# Patient Record
Sex: FEMALE | Race: BLACK OR AFRICAN AMERICAN | Employment: UNEMPLOYED | URBAN - METROPOLITAN AREA
[De-identification: names, ages, dates, MRNs, and addresses within clinical notes are randomized per-mention and may not be internally consistent; named-entity substitution may affect disease eponyms.]

---

## 2017-07-01 ENCOUNTER — HOSPITAL ENCOUNTER (EMERGENCY)
Age: 47
Discharge: HOME | End: 2017-07-01
Attending: EMERGENCY MEDICINE
Payer: SELF-PAY

## 2017-07-01 VITALS
HEIGHT: 61 IN | OXYGEN SATURATION: 97 % | WEIGHT: 160 LBS | DIASTOLIC BLOOD PRESSURE: 79 MMHG | BODY MASS INDEX: 30.21 KG/M2 | HEART RATE: 79 BPM | TEMPERATURE: 99.1 F | SYSTOLIC BLOOD PRESSURE: 145 MMHG | RESPIRATION RATE: 20 BRPM

## 2017-07-01 DIAGNOSIS — J45.901 ASTHMATIC BRONCHITIS WITH ACUTE EXACERBATION: Primary | ICD-10-CM

## 2017-07-01 PROCEDURE — 99282 EMERGENCY DEPT VISIT SF MDM: CPT

## 2017-07-01 RX ORDER — HYDROCODONE BITARTRATE AND ACETAMINOPHEN 5; 325 MG/1; MG/1
TABLET ORAL
Qty: 20 TAB | Refills: 0 | Status: SHIPPED | OUTPATIENT
Start: 2017-07-01 | End: 2020-07-20 | Stop reason: ALTCHOICE

## 2017-07-01 RX ORDER — DOXYCYCLINE 100 MG/1
100 CAPSULE ORAL 2 TIMES DAILY
Qty: 14 CAP | Refills: 0 | Status: SHIPPED | OUTPATIENT
Start: 2017-07-01 | End: 2017-07-08

## 2017-07-01 RX ORDER — ALBUTEROL SULFATE 90 UG/1
2 AEROSOL, METERED RESPIRATORY (INHALATION)
Qty: 1 INHALER | Refills: 5 | Status: SHIPPED | OUTPATIENT
Start: 2017-07-01 | End: 2017-07-31

## 2017-07-01 RX ORDER — PREDNISONE 50 MG/1
50 TABLET ORAL DAILY
Qty: 5 TAB | Refills: 0 | Status: SHIPPED | OUTPATIENT
Start: 2017-07-01 | End: 2017-07-06

## 2017-07-01 NOTE — DISCHARGE INSTRUCTIONS
Bronchitis: Care Instructions  Your Care Instructions    Bronchitis is inflammation of the bronchial tubes, which carry air to the lungs. The tubes swell and produce mucus, or phlegm. The mucus and inflamed bronchial tubes make you cough. You may have trouble breathing. Most cases of bronchitis are caused by viruses like those that cause colds. Antibiotics usually do not help and they may be harmful. Bronchitis usually develops rapidly and lasts about 2 to 3 weeks in otherwise healthy people. Follow-up care is a key part of your treatment and safety. Be sure to make and go to all appointments, and call your doctor if you are having problems. It's also a good idea to know your test results and keep a list of the medicines you take. How can you care for yourself at home? · Take all medicines exactly as prescribed. Call your doctor if you think you are having a problem with your medicine. · Get some extra rest.  · Take an over-the-counter pain medicine, such as acetaminophen (Tylenol), ibuprofen (Advil, Motrin), or naproxen (Aleve) to reduce fever and relieve body aches. Read and follow all instructions on the label. · Do not take two or more pain medicines at the same time unless the doctor told you to. Many pain medicines have acetaminophen, which is Tylenol. Too much acetaminophen (Tylenol) can be harmful. · Take an over-the-counter cough medicine that contains dextromethorphan to help quiet a dry, hacking cough so that you can sleep. Avoid cough medicines that have more than one active ingredient. Read and follow all instructions on the label. · Breathe moist air from a humidifier, hot shower, or sink filled with hot water. The heat and moisture will thin mucus so you can cough it out. · Do not smoke. Smoking can make bronchitis worse. If you need help quitting, talk to your doctor about stop-smoking programs and medicines. These can increase your chances of quitting for good.   When should you call for help? Call 911 anytime you think you may need emergency care. For example, call if:  · You have severe trouble breathing. Call your doctor now or seek immediate medical care if:  · You have new or worse trouble breathing. · You cough up dark brown or bloody mucus (sputum). · You have a new or higher fever. · You have a new rash. Watch closely for changes in your health, and be sure to contact your doctor if:  · You cough more deeply or more often, especially if you notice more mucus or a change in the color of your mucus. · You are not getting better as expected. Where can you learn more? Go to http://nyla-chandu.info/. Enter H333 in the search box to learn more about \"Bronchitis: Care Instructions. \"  Current as of: March 25, 2017  Content Version: 11.3  © 2448-3059 [employer]. Care instructions adapted under license by [employer] (which disclaims liability or warranty for this information). If you have questions about a medical condition or this instruction, always ask your healthcare professional. [address] any warranty or liability for your use of this information.

## 2017-07-01 NOTE — ED TRIAGE NOTES
5 days woke with sinus problems using mucinex. Now having trouble breathing and unable to inhale fully.  Painful cough with yellow phelgm

## 2017-07-01 NOTE — ED NOTES
Pt states ready for discharge. Pt states she will follow up with PCP as instructed by provider. Pt appears in NOAD. I have reviewed discharge instructions with the patient. Prescriptions were reviewed with patient instructed not to drink alcohol, drive a car, or operate heavy machinery while taking this medicine. The patient verbalized understanding. Patient seen leaving ED ambulatory without difficulty or need for assistance, with S/O in no sign of distress. Patient armband removed and shredded. Current Discharge Medication List      START taking these medications    Details   HYDROcodone-acetaminophen (NORCO) 5-325 mg per tablet Take 1-2 tablets PO every 4-6 hours as needed for pain control. If over the counter ibuprofen or acetaminophen was suggested, then only take the vicodin for pain not well controlled with the over the counter medication. Qty: 20 Tab, Refills: 0      doxycycline (MONODOX) 100 mg capsule Take 1 Cap by mouth two (2) times a day for 7 days. Qty: 14 Cap, Refills: 0      predniSONE (DELTASONE) 50 mg tablet Take 1 Tab by mouth daily for 5 days. Qty: 5 Tab, Refills: 0      albuterol (PROVENTIL HFA, VENTOLIN HFA, PROAIR HFA) 90 mcg/actuation inhaler Take 2 Puffs by inhalation every four (4) hours as needed for Wheezing for up to 30 days.   Qty: 1 Inhaler, Refills: 5

## 2017-07-01 NOTE — ED PROVIDER NOTES
HPI Comments: 9:26 AM Teodora Patient is a 40 y.o. female who presents to the ED for the evaluation of cough and congestion about 5 days. Pt also reports fever, rhinorrhea, sinus pressure, sore throat, and pleuritic CP, secondary to coughing. States that she has used a humidifier and Mucin ex, but denies relief. No other complaints at this time. PMHx: Pt has a past medical history of Depression (2014) and Fibromyalgia (2010). She also has no past medical history of Psychotic disorder. PCP: No primary care provider on file. The history is provided by the patient. Past Medical History:   Diagnosis Date    Anxiety     Depression 2014    Fibromyalgia 2010       Past Surgical History:   Procedure Laterality Date    HX TONSILLECTOMY  0         Family History:   Problem Relation Age of Onset    Heart Disease Father     Diabetes Sister        Social History     Social History    Marital status: SINGLE     Spouse name: N/A    Number of children: N/A    Years of education: N/A     Occupational History    Not on file. Social History Main Topics    Smoking status: Former Smoker     Packs/day: 0.04     Types: Cigarettes    Smokeless tobacco: Never Used    Alcohol use 0.6 - 1.2 oz/week     1 - 2 Glasses of wine per week    Drug use: No    Sexual activity: Not on file     Other Topics Concern    Not on file     Social History Narrative         ALLERGIES: Review of patient's allergies indicates no known allergies. Review of Systems   Constitutional: Positive for fever. Negative for chills, fatigue and unexpected weight change. HENT: Positive for congestion, rhinorrhea, sinus pressure and sore throat. Respiratory: Positive for cough. Negative for chest tightness and shortness of breath. Cardiovascular: Positive for chest pain. Negative for palpitations and leg swelling. Gastrointestinal: Negative for abdominal pain, nausea and vomiting. Genitourinary: Negative for dysuria. Musculoskeletal: Negative for back pain. Skin: Negative for rash. Neurological: Negative for dizziness and weakness. Psychiatric/Behavioral: The patient is not nervous/anxious. Vitals:    07/01/17 0930   BP: 145/79   Pulse: 79   Resp: 20   Temp: 99.1 °F (37.3 °C)   SpO2: 97%   Weight: 72.6 kg (160 lb)   Height: 5' 1\" (1.549 m)        97% on RA, indicating adequate oxygenation. Physical Exam   Constitutional: She appears well-developed and well-nourished. No distress. HENT:   Head: Normocephalic. Right Ear: External ear normal.   Left Ear: External ear normal.   Mouth/Throat: No oropharyngeal exudate. Eyes: Conjunctivae and EOM are normal. Pupils are equal, round, and reactive to light. Right eye exhibits no discharge. Left eye exhibits no discharge. No scleral icterus. Neck: Normal range of motion. Neck supple. No tracheal deviation present. No thyromegaly present. Cardiovascular: Normal rate, regular rhythm and normal heart sounds. Exam reveals no gallop and no friction rub. No murmur heard. Pulmonary/Chest: Effort normal. No stridor. No respiratory distress. She has wheezes. She has no rales. She exhibits no tenderness. Abdominal: Soft. Musculoskeletal: She exhibits no edema or tenderness. Lymphadenopathy:     She has no cervical adenopathy. Neurological: She is alert. No cranial nerve deficit. Coordination normal.   Skin: Skin is warm. Psychiatric: She has a normal mood and affect. Her behavior is normal. Judgment and thought content normal.        MDM  Number of Diagnoses or Management Options  Asthmatic bronchitis with acute exacerbation:   Diagnosis management comments: Imp: Asthmatic bronchitis. ED Course       Procedures    Medications ordered:   Medications - No data to display        Reevaluation of patient:   I have reevaluated patient. Patient is feeling better  No results found for this or any previous visit (from the past 12 hour(s)).     Medications ordered:   Medications - No data to display          1. Asthmatic bronchitis with acute exacerbation            Dispo:  Patient was discharged home in stable condition. Patient is to return to emergency department with any new or worsening condition. Follow up with family Sarmad Alcantara here immediately at any time for recurrent symptoms, new symptoms, any concerns, or if unable to obtain follow-up as directed. Diagnosis: No diagnosis found. Follow-up Information     Follow up With Details Comments Pop Call in 3 days  [facility] [id number]  [phone] [address] [facility] EMERGENCY DEPT  As needed, If symptoms worsen [address] [facility]  [phone]           Patient's Medications   Start Taking    No medications on file   Continue Taking    ACETAMINOPHEN (TYLENOL 8 HOUR PO)    Take  by mouth. DULOXETINE (CYMBALTA) 60 MG CAPSULE    Take 1 Cap by mouth daily. These Medications have changed    No medications on file   Stop Taking    No medications on file         SCRIBE ATTESTATION STATEMENT  Documented by: Joel Hurtado. Gustavo Rodriguez for, and in the presence of, Gladis Gutierres MD 9:27 AM     Signed by Harry Whyte, 7/1/2017 9:27 AM     PROVIDER ATTESTATION STATEMENT  I personally performed the services described in the documentation, reviewed the documentation, as recorded by the scribe in my presence, and it accurately and completely records my words and actions.   Gladis Gutierres MD

## 2020-07-20 ENCOUNTER — OFFICE VISIT (OUTPATIENT)
Dept: FAMILY MEDICINE CLINIC | Age: 50
End: 2020-07-20

## 2020-07-20 ENCOUNTER — VIRTUAL VISIT (OUTPATIENT)
Dept: FAMILY MEDICINE CLINIC | Age: 50
End: 2020-07-20

## 2020-07-20 VITALS
RESPIRATION RATE: 18 BRPM | DIASTOLIC BLOOD PRESSURE: 55 MMHG | HEART RATE: 82 BPM | OXYGEN SATURATION: 96 % | WEIGHT: 175.4 LBS | TEMPERATURE: 98.9 F | BODY MASS INDEX: 33.12 KG/M2 | SYSTOLIC BLOOD PRESSURE: 130 MMHG | HEIGHT: 61 IN

## 2020-07-20 DIAGNOSIS — Z13.6 SCREENING FOR CARDIOVASCULAR CONDITION: ICD-10-CM

## 2020-07-20 DIAGNOSIS — E55.9 VITAMIN D DEFICIENCY: ICD-10-CM

## 2020-07-20 DIAGNOSIS — K04.7 TOOTH INFECTION: ICD-10-CM

## 2020-07-20 DIAGNOSIS — M79.7 FIBROMYALGIA: Primary | ICD-10-CM

## 2020-07-20 RX ORDER — DULOXETIN HYDROCHLORIDE 60 MG/1
60 CAPSULE, DELAYED RELEASE ORAL DAILY
Qty: 90 CAP | Refills: 1 | Status: SHIPPED | OUTPATIENT
Start: 2020-07-20

## 2020-07-20 NOTE — PROGRESS NOTES
HPI:  Wendy Hardy is a 51 y.o. female who presents today with   Chief Complaint   Patient presents with    Fibromyalgia     NP    Vitamin D Deficiency    Jaw Swelling     due to tooth infection    Pt is a new pt to the practice. She has a h/o fibromyalgia and is on cymbalta; She needs a refill on cymbalta. Pt also requested antibiotics after she left out of the exam room for a tooth infection she has with associated jaw swelling. She will plan to see a dentist when she can. Pt also has a h/o Vitamin D deficiency. Needs a recheck on this as well.    3 most recent PHQ Screens 11/6/2015   Little interest or pleasure in doing things Several days   Feeling down, depressed, irritable, or hopeless Several days   Total Score PHQ 2 2   she would like to get some screening blood tests done. PMH,  Meds, Allergies, Family History, Social history reviewed      Current Outpatient Medications   Medication Sig Dispense Refill    DULoxetine (CYMBALTA) 60 mg capsule Take 1 Cap by mouth daily. 90 Cap 1    ACETAMINOPHEN (TYLENOL 8 HOUR PO) Take  by mouth. Allergies   Allergen Reactions    Hydrocodone Vertigo                  Review of Systems   Constitutional: Negative for chills and fever. Respiratory: Negative for shortness of breath and wheezing. Cardiovascular: Negative for chest pain and palpitations. All other systems reviewed and are negative.         Visit Vitals  /55   Pulse 82   Temp 98.9 °F (37.2 °C) (Oral)   Resp 18   Ht 5' 1\" (1.549 m)   Wt 175 lb 6.4 oz (79.6 kg)   LMP 06/28/2020   SpO2 96%   BMI 33.14 kg/m²     Physical Exam   Visit Vitals  /55   Pulse 82   Temp 98.9 °F (37.2 °C) (Oral)   Resp 18   Ht 5' 1\" (1.549 m)   Wt 175 lb 6.4 oz (79.6 kg)   LMP 06/28/2020   SpO2 96%   BMI 33.14 kg/m²     General appearance: alert, cooperative, no distress, appears stated age  Neck: supple, symmetrical, trachea midline, no adenopathy, thyroid: not enlarged, symmetric, no tenderness/mass/nodules, no carotid bruit and no JVD  Lungs: clear to auscultation bilaterally  Heart: regular rate and rhythm, S1, S2 normal, no murmur, click, rub or gallop  Extremities: extremities normal, atraumatic, no cyanosis or edema      Assessment/Plan:  Diagnoses and all orders for this visit:    1. Fibromyalgia    2. Screening for cardiovascular condition  -     LIPID PANEL; Future  -     METABOLIC PANEL, BASIC; Future    3. Vitamin D deficiency  -     VITAMIN D, 25 HYDROXY; Future    4. Tooth infection    Other orders  -     DULoxetine (CYMBALTA) 60 mg capsule; Take 1 Cap by mouth daily. As above, all new to this provider   treatment plan as listed below  Orders Placed This Encounter    LIPID PANEL    METABOLIC PANEL, BASIC    VITAMIN D, 25 HYDROXY    DULoxetine (CYMBALTA) 60 mg capsule     Refilled cymbalta  Labs as ordered      Follow-up and Dispositions    · Return in about 2 months (around 9/20/2020), or PAP / BREAST EXAM.      This has been fully explained to the patient, who indicates understanding.         Vinicio Noe MD

## 2020-07-20 NOTE — PATIENT INSTRUCTIONS
Fibromyalgia: Care Instructions  Overview     Fibromyalgia is a painful condition that is not completely understood by medical experts. The cause of fibromyalgia is not known. It can make you feel tired and ache all over. It causes tender spots at specific points of the body that hurt only when you press on them. You may have trouble sleeping, as well as other symptoms. These problems can upset your work and home life. Symptoms tend to come and go, although they may never go away completely. Fibromyalgia does not harm your muscles, joints, or organs. Follow-up care is a key part of your treatment and safety. Be sure to make and go to all appointments, and call your doctor if you are having problems. It's also a good idea to know your test results and keep a list of the medicines you take. How can you care for yourself at home? · Exercise often. Walk, swim, or bike to help with pain and sleep problems and to make you feel better. · Try to get a good night's sleep. Go to bed and get up at the same time each day, whether you feel rested or not. Make sure you have a good mattress and pillow. · Reduce stress. Avoid things that cause you stress, if you can. If not, work at making them less stressful. Learn to use biofeedback, guided imagery, meditation, or other methods to relax. · Make healthy changes. Eat a balanced diet, quit smoking, and limit alcohol and caffeine. · Use a heating pad set on low or take warm baths or showers for pain. Using cold packs for up to 20 minutes at a time can also relieve pain. Put a thin cloth between the cold pack and your skin. A gentle massage might help too. · Be safe with medicines. Take your medicines exactly as prescribed. Call your doctor if you think you are having a problem with your medicine. Your doctor may talk to you about taking antidepressant medicines. These medicines may improve sleep, relieve pain, and in some cases treat depression.   · Learn about fibromyalgia. This makes coping easier. Then, take an active role in your treatment. · Think about joining a support group with others who have fibromyalgia to learn more and get support. When should you call for help? Watch closely for changes in your health, and be sure to contact your doctor if:  · You feel sad, helpless, or hopeless; lose interest in things you used to enjoy; or have other symptoms of depression. · Your fibromyalgia symptoms get worse. Where can you learn more? Go to http://nyla-chandu.info/  Enter V003 in the search box to learn more about \"Fibromyalgia: Care Instructions. \"  Current as of: November 20, 2019               Content Version: 12.5  © 6926-3424 Healthwise, [employer]. Care instructions adapted under license by [employer] (which disclaims liability or warranty for this information). If you have questions about a medical condition or this instruction, always ask your healthcare professional. [address] any warranty or liability for your use of this information.

## 2020-07-20 NOTE — PROGRESS NOTES
Candida Lea is a  51 y.o. female presents today for office visit for establish care. 1. Have you been to the ER, urgent care clinic or hospitalized since your last visit? NO .     2. Have you seen or consulted any other health care providers outside of the [address] [place] [address] since your last visit (Include any pap smears or colon screening)? NO

## 2020-07-20 NOTE — PROGRESS NOTES
Noe Molina is a  51 y.o. female presents today for office visit to establish care. 1. Have you been to the ER, urgent care clinic or hospitalized since your last visit?no        2. Have you seen or consulted any other health care providers outside of the [address] [place] [address] since your last visit (Include any pap smears or colon screening)? no

## 2020-07-25 PROBLEM — K04.7 TOOTH INFECTION: Status: ACTIVE | Noted: 2020-07-25

## 2020-07-25 PROBLEM — E55.9 VITAMIN D DEFICIENCY: Status: ACTIVE | Noted: 2020-07-25

## 2020-09-15 LAB
25(OH)D3+25(OH)D2 SERPL-MCNC: 28.9 NG/ML (ref 30–100)
BUN SERPL-MCNC: 8 MG/DL (ref 6–24)
BUN/CREAT SERPL: 9 (ref 9–23)
CALCIUM SERPL-MCNC: 9.7 MG/DL (ref 8.7–10.2)
CHLORIDE SERPL-SCNC: 101 MMOL/L (ref 96–106)
CHOLEST SERPL-MCNC: 208 MG/DL (ref 100–199)
CO2 SERPL-SCNC: 26 MMOL/L (ref 20–29)
CREAT SERPL-MCNC: 0.89 MG/DL (ref 0.57–1)
GLUCOSE SERPL-MCNC: 83 MG/DL (ref 65–99)
HDLC SERPL-MCNC: 46 MG/DL
LDLC SERPL CALC-MCNC: 143 MG/DL (ref 0–99)
POTASSIUM SERPL-SCNC: 4.1 MMOL/L (ref 3.5–5.2)
SODIUM SERPL-SCNC: 140 MMOL/L (ref 134–144)
TRIGL SERPL-MCNC: 105 MG/DL (ref 0–149)
VLDLC SERPL CALC-MCNC: 19 MG/DL (ref 5–40)

## 2021-05-06 ENCOUNTER — VIRTUAL VISIT (OUTPATIENT)
Dept: FAMILY MEDICINE CLINIC | Age: 51
End: 2021-05-06
Payer: MEDICAID

## 2021-05-06 DIAGNOSIS — M25.512 CHRONIC LEFT SHOULDER PAIN: Primary | ICD-10-CM

## 2021-05-06 DIAGNOSIS — L98.9 LESION OF THUMB: ICD-10-CM

## 2021-05-06 DIAGNOSIS — G89.29 CHRONIC LEFT SHOULDER PAIN: Primary | ICD-10-CM

## 2021-05-06 DIAGNOSIS — M79.604 RIGHT LEG PAIN: ICD-10-CM

## 2021-05-06 PROCEDURE — 99214 OFFICE O/P EST MOD 30 MIN: CPT | Performed by: FAMILY MEDICINE

## 2021-05-06 RX ORDER — ALPRAZOLAM 0.5 MG/1
TABLET ORAL
COMMUNITY
Start: 2021-04-15

## 2021-05-06 RX ORDER — TRAZODONE HYDROCHLORIDE 100 MG/1
TABLET ORAL
COMMUNITY
Start: 2021-04-15 | End: 2022-09-22

## 2021-05-06 RX ORDER — IBUPROFEN 600 MG/1
600 TABLET ORAL
Qty: 60 TAB | Refills: 0 | Status: SHIPPED | OUTPATIENT
Start: 2021-05-06 | End: 2022-07-19 | Stop reason: ALTCHOICE

## 2021-05-06 NOTE — PROGRESS NOTES
Oneal Blood is a 53 y.o. female who was seen by synchronous (real-time) audio-video technology on 5/6/2021 for No chief complaint on file. 
 
n 
 
Assessment & Plan:  
{A/P PLUS DISPO WYAL[place]:79954} [address] Subjective:  
 
 
Prior to Admission medications Medication Sig Start Date End Date Taking? Authorizing Provider DULoxetine (CYMBALTA) 60 mg capsule Take 1 Cap by mouth daily. 7/20/20   Jose Cruz Khan MD  
ACETAMINOPHEN (TYLENOL 8 HOUR PO) Take  by mouth. Provider, Historical  
 
 
 
ROS Objective:  
 
Patient-Reported Vitals 5/6/2021 Patient-Reported Weight 168 Patient-Reported Height 5'1 Patient-Reported Temperature 98.6 Patient-Reported LMP 4/23/2021 General: alert, cooperative, no distress Mental  status: normal mood, behavior, speech, dress, motor activity, and thought processes, able to follow commands HENT: NCAT Neck: no visualized mass Resp: no respiratory distress Neuro: no gross deficits Skin: no discoloration or lesions of concern on visible areas Psychiatric: normal affect, consistent with stated mood, no evidence of hallucinations Additional exam findings: We discussed the expected course, resolution and complications of the diagnosis(es) in detail. Medication risks, benefits, costs, interactions, and alternatives were discussed as indicated. I advised her to contact the office if her condition worsens, changes or fails to improve as anticipated. She expressed understanding with the diagnosis(es) and plan. Oneal Blood, was evaluated through a synchronous (real-time) audio-video encounter. The patient (or guardian if applicable) is aware that this is a billable service. Verbal consent to proceed has been obtained within the past 12 months.  The visit was conducted pursuant to the emergency declaration under the [facility] waiver authority and the [employer] and [employer] Appropriations Act. Patient identification was verified, and a caregiver was present when appropriate. The patient was located in a state where the provider was credentialed to provide care. Glenis Roldan MD 
 
Unable to connect on the virtual platform with pt. This encounter was created in error - please disregard. This encounter was created in error - please disregard.

## 2021-05-15 NOTE — PATIENT INSTRUCTIONS
Joint Pain: Care Instructions Your Care Instructions Many people have small aches and pains from overuse or injury to muscles and joints. Joint injuries often happen during sports or recreation, work tasks, or projects around the home. An overuse injury can happen when you put too much stress on a joint or when you do an activity that stresses the joint over and over, such as using the computer or rowing a boat. You can take action at home to help your muscles and joints get better. You should feel better in 1 to 2 weeks, but it can take 3 months or more to heal completely. Follow-up care is a key part of your treatment and safety. Be sure to make and go to all appointments, and call your doctor if you are having problems. It's also a good idea to know your test results and keep a list of the medicines you take. How can you care for yourself at home? · Do not put weight on the injured joint for at least a day or two. · For the first day or two after an injury, do not take hot showers or baths, and do not use hot packs. The heat could make swelling worse. · Put ice or a cold pack on the sore joint for 10 to 20 minutes at a time. Try to do this every 1 to 2 hours for the next 3 days (when you are awake) or until the swelling goes down. Put a thin cloth between the ice and your skin. · Wrap the injury in an elastic bandage. Do not wrap it too tightly because this can cause more swelling. · Prop up the sore joint on a pillow when you ice it or anytime you sit or lie down during the next 3 days. Try to keep it above the level of your heart. This will help reduce swelling. · Take an over-the-counter pain medicine, such as acetaminophen (Tylenol), ibuprofen (Advil, Motrin), or naproxen (Aleve). Read and follow all instructions on the label. · After 1 or 2 days of rest, begin moving the joint gently.  While the joint is still healing, you can begin to exercise using activities that do not strain or hurt the painful joint. When should you call for help? Call your doctor now or seek immediate medical care if: 
  · You have signs of infection, such as: 
? Increased pain, swelling, warmth, and redness. ? Red streaks leading from the joint. ? A fever. Watch closely for changes in your health, and be sure to contact your doctor if: 
  · Your movement or symptoms are not getting better after 1 to 2 weeks of home treatment. Where can you learn more? Go to http://nyla-chandu.info/ Enter P205 in the search box to learn more about \"Joint Pain: Care Instructions. \" Current as of: November 16, 2020               Content Version: 12.8 © 5318-4660 Healthwise, [employer]. Care instructions adapted under license by [employer] (which disclaims liability or warranty for this information). If you have questions about a medical condition or this instruction, always ask your healthcare professional. [address] any warranty or liability for your use of this information.

## 2021-05-15 NOTE — PROGRESS NOTES
Audrey Ramirez is a 53 y.o. female who was seen by synchronous (real-time) audio-video technology on 5/6/2021 for Shoulder Pain and Leg Swelling        Assessment & Plan:   Diagnoses and all orders for this visit:    1. Chronic left shoulder pain  -     XR SHOULDER LT AP/LAT MIN 2 V; Future    2. Right leg pain  -     DUPLEX LOWER EXT VENOUS RIGHT; Future    3. Lesion of thumb  -     REFERRAL TO PLASTIC SURGERY    Other orders  -     ibuprofen (MOTRIN) 600 mg tablet; Take 1 Tab by mouth every twelve (12) hours as needed for Pain. With food. As above, not controlled   treatment plan as listed below  Orders Placed This Encounter    XR SHOULDER LT AP/LAT MIN 2 V    REFERRAL TO PLASTIC SURGERY    ALPRAZolam (XANAX) 0.5 mg tablet    traZODone (DESYREL) 100 mg tablet    ibuprofen (MOTRIN) 600 mg tablet     meds reconciled  Follow-up and Dispositions    · Return in about 1 month (around 6/6/2021). This has been fully explained to the patient, who indicates understanding. AVS is accessible thru [facility] and pt has been advised of same. 712  Subjective:   Patient continues to complain of shoulder pain. She reports that she has difficulty raising the left arm. She also states that she has to shake it at times to [place] it. \"  She has some neck pain in the mid neck area or to the right. He has had no injury. She does have a history of previous rotator cuff injury. She does think that her hand is swollen. She has a history of fibromyalgia. She continues on Cymbalta. Patient also noted some right leg swelling. She denies any injury to the leg. No recent long distance travel. Patient also complains of a lesion on the right thumb. It has been present for 6 months. Area hurts if she pushes on it. Would like to have the lesion removed. Prior to Admission medications    Medication Sig Start Date End Date Taking?  Authorizing Provider   ALPRAZolam Abbielisha Coronel) 0.5 mg tablet TAKE ONE HALF TO 1 TABLET EVERY DAY AS NEEDED FOR ANXIETY 4/15/21  Yes Provider, Historical   traZODone (DESYREL) 100 mg tablet TAKE 1 2 TO 1 (ONE HALF TO ONE) TABLET BY MOUTH EVERY DAY AT BEDTIME AS NEEDED FOR INSOMNIA 4/15/21  Yes Provider, Historical   ibuprofen (MOTRIN) 600 mg tablet Take 1 Tab by mouth every twelve (12) hours as needed for Pain. With food. 21  Yes Pham Bates MD   DULoxetine (CYMBALTA) 60 mg capsule Take 1 Cap by mouth daily. 20  Yes Pham Bates MD   ACETAMINOPHEN (TYLENOL 8 HOUR PO) Take  by mouth. Yes Provider, Historical     Patient Active Problem List    Diagnosis Date Noted    Tooth infection 2020    Vitamin D deficiency 2020    Anxiety     Fibromyalgia 2015    PTSD (post-traumatic stress disorder) 2015    Depression 2015     Allergies   Allergen Reactions    Hydrocodone Vertigo     Past Medical History:   Diagnosis Date    Anxiety     Depression 2014    Fibromyalgia 2010    Sciatica     right     Past Surgical History:   Procedure Laterality Date    HX TONSILLECTOMY  0     Family History   Problem Relation Age of Onset    Heart Disease Father     Diabetes Sister      Social History     Tobacco Use    Smoking status: Former Smoker     Packs/day: 0.04     Types: Cigarettes     Quit date:      Years since quittin.3    Smokeless tobacco: Never Used   Substance Use Topics    Alcohol use:  Yes     Alcohol/week: 1.0 - 2.0 standard drinks     Types: 1 - 2 Glasses of wine per week       ROS as per HPI    Objective:     Patient-Reported Vitals 2021   Patient-Reported Weight 168   Patient-Reported Height 5'1   Patient-Reported Temperature 98.6   Patient-Reported LMP 2021      General: alert, cooperative, no distress   Mental  status: normal mood, behavior, speech, dress, motor activity, and thought processes, able to follow commands   HENT: NCAT   Neck: no visualized mass   Resp: no respiratory distress   Neuro: no gross deficits Skin: no discoloration or lesions of concern on visible areas   Psychiatric: normal affect, consistent with stated mood, no evidence of hallucinations     Additional exam findings: Right leg: there does appear to be some fullness in the right ankle area. Some mild pitting noted upon patient's compression of the area. We discussed the expected course, resolution and complications of the diagnosis(es) in detail. Medication risks, benefits, costs, interactions, and alternatives were discussed as indicated. I advised her to contact the office if her condition worsens, changes or fails to improve as anticipated. She expressed understanding with the diagnosis(es) and plan. Flaco Levi, was evaluated through a synchronous (real-time) audio-video encounter. The patient (or guardian if applicable) is aware that this is a billable service. Verbal consent to proceed has been obtained within the past 12 months. The visit was conducted pursuant to the emergency declaration under the [address] [place] [address] authority and the [employer] General Act. Patient identification was verified, and a caregiver was present when appropriate. The patient was located in a state where the provider was credentialed to provide care.     Tory Lizama MD

## 2021-05-26 ENCOUNTER — OFFICE VISIT (OUTPATIENT)
Dept: FAMILY MEDICINE CLINIC | Age: 51
End: 2021-05-26
Payer: MEDICAID

## 2021-05-26 VITALS
RESPIRATION RATE: 24 BRPM | HEART RATE: 75 BPM | DIASTOLIC BLOOD PRESSURE: 79 MMHG | BODY MASS INDEX: 33.79 KG/M2 | SYSTOLIC BLOOD PRESSURE: 160 MMHG | WEIGHT: 179 LBS | OXYGEN SATURATION: 96 % | HEIGHT: 61 IN

## 2021-05-26 DIAGNOSIS — E55.9 VITAMIN D DEFICIENCY: ICD-10-CM

## 2021-05-26 DIAGNOSIS — E78.00 HYPERCHOLESTEREMIA: ICD-10-CM

## 2021-05-26 DIAGNOSIS — Z01.419 WELL WOMAN EXAM WITH ROUTINE GYNECOLOGICAL EXAM: Primary | ICD-10-CM

## 2021-05-26 PROCEDURE — 99396 PREV VISIT EST AGE 40-64: CPT | Performed by: FAMILY MEDICINE

## 2021-05-26 NOTE — PROGRESS NOTES
Ernesto Pérez presents today for   Chief Complaint   Patient presents with    Well Woman     pap exam       Is someone accompanying this pt? No    Is the patient using any DME equipment during OV? No    Depression Screening:  3 most recent PHQ Screens 5/26/2021   PHQ Not Done Active Diagnosis of Depression or Bipolar Disorder   Little interest or pleasure in doing things -   Feeling down, depressed, irritable, or hopeless -   Total Score PHQ 2 -       Learning Assessment:  Learning Assessment 11/6/2015   PRIMARY LEARNER Patient   PRIMARY LANGUAGE ENGLISH   LEARNER PREFERENCE PRIMARY LISTENING     READING   ANSWERED BY patient   RELATIONSHIP SELF       Travel Screening:    Travel Screening     Question   Response    In the last month, have you been in contact with someone who was confirmed or suspected to have Coronavirus / COVID-19? No / Unsure    Have you had a COVID-19 viral test in the last 14 days? No    Do you have any of the following new or worsening symptoms? None of these    Have you traveled internationally or domestically in the last month? No      Travel History   Travel since 04/26/21     No documented travel since 04/26/21          Health Maintenance reviewed and discussed and ordered per Provider. Health Maintenance Due   Topic Date Due    Hepatitis C Screening  Never done    COVID-19 Vaccine (1) Never done    DTaP/Tdap/Td series (1 - Tdap) Never done    PAP AKA CERVICAL CYTOLOGY  Never done   . Coordination of Care:  1. Have you been to the ER, urgent care clinic since your last visit? Hospitalized since your last visit? No    2. Have you seen or consulted any other health care providers outside of the [address] [place] [address] since your last visit? Include any pap smears or colon screening.  No

## 2021-05-27 ENCOUNTER — HOSPITAL ENCOUNTER (OUTPATIENT)
Dept: VASCULAR SURGERY | Age: 51
Discharge: HOME | End: 2021-05-27
Attending: FAMILY MEDICINE
Payer: MEDICAID

## 2021-05-27 DIAGNOSIS — M79.604 RIGHT LEG PAIN: ICD-10-CM

## 2021-05-27 PROCEDURE — 93971 EXTREMITY STUDY: CPT

## 2021-05-28 NOTE — PATIENT INSTRUCTIONS

## 2021-05-28 NOTE — PROGRESS NOTES
Subjective:   53 y.o. female for Well Woman Check. Patient's last menstrual period was 2021. Patient has been well. Blood pressure is up today. She is due for blood work. Social History: has sex with males. Pertinent past medical hstory: as below. Patient Active Problem List    Diagnosis Date Noted    Tooth infection 2020    Vitamin D deficiency 2020    Anxiety     Fibromyalgia 2015    PTSD (post-traumatic stress disorder) 2015    Depression 2015     Current Outpatient Medications   Medication Sig Dispense Refill    ALPRAZolam (XANAX) 0.5 mg tablet TAKE ONE HALF TO 1 TABLET EVERY DAY AS NEEDED FOR ANXIETY      traZODone (DESYREL) 100 mg tablet TAKE 1 2 TO 1 (ONE HALF TO ONE) TABLET BY MOUTH EVERY DAY AT BEDTIME AS NEEDED FOR INSOMNIA      ibuprofen (MOTRIN) 600 mg tablet Take 1 Tab by mouth every twelve (12) hours as needed for Pain. With food. 60 Tab 0    DULoxetine (CYMBALTA) 60 mg capsule Take 1 Cap by mouth daily. 90 Cap 1    ACETAMINOPHEN (TYLENOL 8 HOUR PO) Take  by mouth. Allergies   Allergen Reactions    Hydrocodone Vertigo     Past Medical History:   Diagnosis Date    Anxiety     Depression     Fibromyalgia 2010    Sciatica     right     Past Surgical History:   Procedure Laterality Date    HX TONSILLECTOMY  0     Family History   Problem Relation Age of Onset    Heart Disease Father     Diabetes Sister      Social History     Tobacco Use    Smoking status: Former Smoker     Packs/day: 0.04     Types: Cigarettes     Quit date:      Years since quittin.4    Smokeless tobacco: Never Used   Substance Use Topics    Alcohol use: Yes     Alcohol/week: 1.0 - 2.0 standard drinks     Types: 1 - 2 Glasses of wine per week        ROS:  Feeling well. No dyspnea or chest pain on exertion. No abdominal pain, change in bowel habits, black or bloody stools. No urinary tract symptoms.  GYN ROS: normal menses, no abnormal bleeding, pelvic pain or discharge, no breast pain or new or enlarging lumps on self exam. No neurological complaints. Right thumb soft prominence of prominence noted; no distinct mass or nodule; question of previous ganglion cyst    Objective:     Visit Vitals  BP (!) (P) 160/90   Pulse 75   Resp 24   Ht 5' 1\" (1.549 m)   Wt 179 lb (81.2 kg)   LMP 05/20/2021   SpO2 96%   BMI 33.82 kg/m²     The patient appears well, alert, oriented x 3, in no distress. ENT normal.  Neck supple. No adenopathy or thyromegaly. LAURIE. Lungs are clear, good air entry, no wheezes, rhonchi or rales. S1 and S2 normal, no murmurs, regular rate and rhythm. Abdomen soft without tenderness, guarding, mass or organomegaly. Extremities show no edema, normal peripheral pulses. Neurological is normal, no focal findings. BREAST EXAM: breasts appear normal, no suspicious masses, no skin or nipple changes or axillary nodes    PELVIC EXAM: VULVA: normal appearing vulva with no masses, tenderness or lesions, VAGINA: normal appearing vagina with normal color and discharge, no lesions, CERVIX: normal appearing cervix without discharge or lesions, UTERUS: uterus is normal size, shape, consistency and nontender, ADNEXA: normal adnexa in size, nontender and no masses    Assessment/Plan:   well woman  mammogram  pap smear  additional lab tests per orders  return annually or prn    ICD-10-CM ICD-9-CM    1. Well woman exam with routine gynecological exam  Z01.419 V72.31    2. Vitamin D deficiency  E55.9 268.9    3. Hypercholesteremia  E78.00 272.0 LIPID PANEL      METABOLIC PANEL, COMPREHENSIVE   . As above  Encourage diet and exercise to help control blood pressure will monitor for now. Labs as ordered  Monitor thumb  Pathophysiology of ganglion cyst  Reviewed  Follow-up and Dispositions    · Return in about 3 months (around 8/26/2021) for htn. This has been fully explained to the patient, who indicates understanding.   An After Visit Summary was printed and given to the patient.

## 2021-06-01 LAB
CYTOLOGIST CVX/VAG CYTO: NORMAL
CYTOLOGY CVX/VAG DOC CYTO: NORMAL
CYTOLOGY CVX/VAG DOC THIN PREP: NORMAL
DX ICD CODE: NORMAL
LABCORP, 190119: NORMAL
Lab: NORMAL
OTHER STN SPEC: NORMAL
STAT OF ADQ CVX/VAG CYTO-IMP: NORMAL

## 2021-06-08 ENCOUNTER — VIRTUAL VISIT (OUTPATIENT)
Dept: FAMILY MEDICINE CLINIC | Age: 51
End: 2021-06-08

## 2021-06-22 ENCOUNTER — TELEPHONE (OUTPATIENT)
Dept: FAMILY MEDICINE CLINIC | Age: 51
End: 2021-06-22

## 2022-03-19 PROBLEM — E55.9 VITAMIN D DEFICIENCY: Status: ACTIVE | Noted: 2020-07-25

## 2022-03-20 PROBLEM — K04.7 TOOTH INFECTION: Status: ACTIVE | Noted: 2020-07-25

## 2022-07-19 ENCOUNTER — OFFICE VISIT (OUTPATIENT)
Dept: FAMILY MEDICINE CLINIC | Age: 52
End: 2022-07-19
Payer: MEDICAID

## 2022-07-19 VITALS
WEIGHT: 181.6 LBS | TEMPERATURE: 98.6 F | HEIGHT: 61 IN | OXYGEN SATURATION: 98 % | DIASTOLIC BLOOD PRESSURE: 76 MMHG | RESPIRATION RATE: 16 BRPM | HEART RATE: 85 BPM | BODY MASS INDEX: 34.29 KG/M2 | SYSTOLIC BLOOD PRESSURE: 135 MMHG

## 2022-07-19 DIAGNOSIS — K59.09 CHRONIC CONSTIPATION: ICD-10-CM

## 2022-07-19 DIAGNOSIS — Z12.11 SCREENING FOR COLON CANCER: ICD-10-CM

## 2022-07-19 DIAGNOSIS — E78.00 HYPERCHOLESTEREMIA: ICD-10-CM

## 2022-07-19 DIAGNOSIS — E55.9 VITAMIN D DEFICIENCY: ICD-10-CM

## 2022-07-19 DIAGNOSIS — E66.9 OBESITY (BMI 30.0-34.9): ICD-10-CM

## 2022-07-19 DIAGNOSIS — L98.9 LESION OF THUMB: ICD-10-CM

## 2022-07-19 DIAGNOSIS — R10.31 CHRONIC RLQ PAIN: ICD-10-CM

## 2022-07-19 DIAGNOSIS — G89.29 CHRONIC RLQ PAIN: ICD-10-CM

## 2022-07-19 DIAGNOSIS — Z12.31 OTHER SCREENING MAMMOGRAM: ICD-10-CM

## 2022-07-19 DIAGNOSIS — Z11.3 ROUTINE SCREENING FOR STI (SEXUALLY TRANSMITTED INFECTION): ICD-10-CM

## 2022-07-19 DIAGNOSIS — Z00.00 WELL WOMAN EXAM (NO GYNECOLOGICAL EXAM): Primary | ICD-10-CM

## 2022-07-19 LAB
HCG URINE, QL. (POC): NEGATIVE
VALID INTERNAL CONTROL?: YES

## 2022-07-19 PROCEDURE — 99214 OFFICE O/P EST MOD 30 MIN: CPT | Performed by: FAMILY MEDICINE

## 2022-07-19 PROCEDURE — 99396 PREV VISIT EST AGE 40-64: CPT | Performed by: FAMILY MEDICINE

## 2022-07-19 PROCEDURE — 81025 URINE PREGNANCY TEST: CPT | Performed by: FAMILY MEDICINE

## 2022-07-19 RX ORDER — PHENTERMINE HYDROCHLORIDE 37.5 MG/1
37.5 TABLET ORAL
Qty: 30 TABLET | Refills: 2 | Status: SHIPPED | OUTPATIENT
Start: 2022-07-19

## 2022-07-19 NOTE — PROGRESS NOTES
1. \"Have you been to the ER, urgent care clinic since your last visit? Hospitalized since your last visit? \" Yes, in [facility] for COVID testing x 1 month     2. \"Have you seen or consulted any other health care providers outside of the [address] [place] [address] since your last visit? \" Yes, therapist Dr. Mir Bullard      3. For patients aged 39-70: Has the patient had a colonoscopy / FIT/ Cologuard? No      If the patient is female:    4. For patients aged 41-77: Has the patient had a mammogram within the past 2 years? No      5. For patients aged 21-65: Has the patient had a pap smear?  Yes - no Care Gap present

## 2022-07-19 NOTE — PROGRESS NOTES
Subjective:   49 y.o. female for Well Woman Check. Wants a med for weight loss; exercises, she is eating well; she can't seem to lose weight;   Has had a new sexual  partner. States condom broke. Wants to make sure she is not pregnant. Also requests STI testing. LMP: 7/5/2022. Gets pain in her abdomen when she gets bloated when she is constipated; has not had a colonoscopy. Constipation has been present X 2 months as has the pain in the RLQ when she is constipated. Urine pregnancy test is negative. Pt has hypercholesterolemia    She also thinks the lesion on her thumb is getting bigger; she would like to now go to Hand surgery. Wt Readings from Last 3 Encounters:   07/19/22 181 lb 9.6 oz (82.4 kg)   05/26/21 179 lb (81.2 kg)   07/20/20 175 lb 6.4 oz (79.6 kg)           Patient Active Problem List    Diagnosis Date Noted    Tooth infection 07/25/2020    Vitamin D deficiency 07/25/2020    Anxiety     Fibromyalgia 11/06/2015    PTSD (post-traumatic stress disorder) 11/06/2015    Depression 11/06/2015     Current Outpatient Medications   Medication Sig Dispense Refill    ALPRAZolam (XANAX) 0.5 mg tablet TAKE ONE HALF TO 1 TABLET EVERY DAY AS NEEDED FOR ANXIETY      traZODone (DESYREL) 100 mg tablet TAKE 1 2 TO 1 (ONE HALF TO ONE) TABLET BY MOUTH EVERY DAY AT BEDTIME AS NEEDED FOR INSOMNIA      DULoxetine (CYMBALTA) 60 mg capsule Take 1 Cap by mouth daily. 90 Cap 1    ACETAMINOPHEN (TYLENOL 8 HOUR PO) Take  by mouth. ibuprofen (MOTRIN) 600 mg tablet Take 1 Tab by mouth every twelve (12) hours as needed for Pain. With food.  (Patient not taking: Reported on 7/19/2022) 60 Tab 0     Allergies   Allergen Reactions    Hydrocodone Vertigo     Past Medical History:   Diagnosis Date    Anxiety     Depression 2014    Fibromyalgia 2010    Sciatica     right     Past Surgical History:   Procedure Laterality Date    HX TONSILLECTOMY  0     Family History   Problem Relation Age of Onset    Heart Disease Father     Diabetes Sister      Social History     Tobacco Use    Smoking status: Former Smoker     Packs/day: 0.04     Types: Cigarettes     Quit date:      Years since quittin.5    Smokeless tobacco: Never Used   Substance Use Topics    Alcohol use: Yes     Alcohol/week: 1.0 - 2.0 standard drink     Types: 1 - 2 Glasses of wine per week        Lab Results   Component Value Date/Time    WBC 4.7 2015 10:08 AM    HGB 13.1 2015 10:08 AM    HCT 38.7 2015 10:08 AM    PLATELET 324  10:08 AM    MCV 91.1 2015 10:08 AM     Lab Results   Component Value Date/Time    Hemoglobin A1c 5.2 2015 10:08 AM    Glucose 83 2020 09:09 AM    LDL, calculated 143 (H) 2020 09:09 AM    LDL, calculated 137.2 (H) 2015 10:08 AM    Creatinine 0.89 2020 09:09 AM         Specific concerns today:  Has chronic constipation. Review of Systems  A comprehensive review of systems was negative except for that written in the HPI. Objective:   Height 5' 1\" (1.549 m). Physical Examination:   General appearance - alert, well appearing, and in no distress  Ears - bilateral TM's and external ear canals normal  Chest - clear to auscultation, no wheezes, rales or rhonchi, symmetric air entry  Heart - normal rate, regular rhythm, normal S1, S2, no murmurs, rubs, clicks or gallops  Abdomen - soft, nontender, nondistended, no masses or organomegaly  Minimally tender RLQ; no rebound, no guarding  Breasts - breasts appear normal, no suspicious masses, no skin or nipple changes or axillary nodes  Neurological - alert, oriented, normal speech, no focal findings or movement disorder noted  Musculoskeletal - no joint tenderness, deformity or swelling; thumb,very small nonspecific prominence noted at the DIP of the thumb. Extremities - no pedal edema noted  Skin - normal coloration and turgor, no rashes, no suspicious skin lesions noted     Assessment/Plan:       ICD-10-CM ICD-9-CM    1.  Well woman exam (no gynecological exam)  Z00.00 V70.0     [V70.0]   2. Vitamin D deficiency  E55.9 268.9 VITAMIN D, 25 HYDROXY      VITAMIN D, 25 HYDROXY      VITAMIN D, 25 HYDROXY   3. Hypercholesteremia  E78.00 272.0 LIPID PANEL      METABOLIC PANEL, COMPREHENSIVE      METABOLIC PANEL, COMPREHENSIVE      LIPID PANEL   4. Lesion of thumb - mod 25 L98.9 709.9 REFERRAL TO HAND SURGERY      REFERRAL TO GASTROENTEROLOGY   5. Other screening mammogram  Z12.31 V76.12 PREM 3D MAITE W MAMMO BI SCREENING INCL CAD   6. Screening for colon cancer  Z12.11 V76.51 REFERRAL TO GASTROENTEROLOGY   7. Chronic RLQ pain - mod 25 R10.31 789.03 AMB POC URINE PREGNANCY TEST, VISUAL COLOR COMPARISON    G89.29 338.29    8. Chronic constipation - mod 25 K59.09 564.00    9. Obesity (BMI 30.0-34.9) - mod 25 E66.9 278.00 phentermine (ADIPEX-P) 37.5 mg tablet   10. Routine screening for STI (sexually transmitted infection)  Z11.3 V74.5 CHLAMYDIA/NEISSERIA/TRICHOMONAS AMP       As above   treatment plan as listed below  Orders Placed This Encounter    PREM 3D MAITE W MAMMO BI SCREENING INCL CAD    LIPID PANEL    METABOLIC PANEL, COMPREHENSIVE    VITAMIN D, 25 HYDROXY    VITAMIN D, 25 HYDROXY    CHLAMYDIA/NEISSERIA/TRICHOMONAS AMP ([facility] Only)    REFERRAL TO HAND SURGERY    REFERRAL TO GASTROENTEROLOGY    AMB POC URINE PREGNANCY TEST, VISUAL COLOR COMPARISON    phentermine (ADIPEX-P) 37.5 mg tablet     Start adipex as ordered  GI referral for chronic constipation and colon cancer screening  Hand surgery consult  Follow-up and Dispositions    Return in about 3 months (around 10/19/2022) for weight. This has been fully explained to the patient, who indicates understanding. An After Visit Summary was printed and given to the patient.

## 2022-07-19 NOTE — PATIENT INSTRUCTIONS

## 2022-07-23 LAB
25(OH)D3+25(OH)D2 SERPL-MCNC: 28.2 NG/ML (ref 30–100)
ALBUMIN SERPL-MCNC: 4.1 G/DL (ref 3.8–4.8)
ALBUMIN/GLOB SERPL: 1.3 {RATIO} (ref 1.2–2.2)
ALP SERPL-CCNC: 90 IU/L (ref 44–121)
ALT SERPL-CCNC: 9 IU/L (ref 0–32)
AST SERPL-CCNC: 6 IU/L (ref 0–40)
BILIRUB SERPL-MCNC: 0.4 MG/DL (ref 0–1.2)
BUN SERPL-MCNC: 7 MG/DL (ref 6–24)
BUN/CREAT SERPL: 9 (ref 9–23)
C TRACH RRNA SPEC QL NAA+PROBE: NEGATIVE
CALCIUM SERPL-MCNC: 9.5 MG/DL (ref 8.7–10.2)
CHLORIDE SERPL-SCNC: 105 MMOL/L (ref 96–106)
CHOLEST SERPL-MCNC: 241 MG/DL (ref 100–199)
CO2 SERPL-SCNC: 26 MMOL/L (ref 20–29)
CREAT SERPL-MCNC: 0.77 MG/DL (ref 0.57–1)
EGFR: 94 ML/MIN/1.73
GLOBULIN SER CALC-MCNC: 3.1 G/DL (ref 1.5–4.5)
GLUCOSE SERPL-MCNC: 81 MG/DL (ref 65–99)
HDLC SERPL-MCNC: 55 MG/DL
LDLC SERPL CALC-MCNC: 166 MG/DL (ref 0–99)
N GONORRHOEA RRNA SPEC QL NAA+PROBE: NEGATIVE
POTASSIUM SERPL-SCNC: 4.4 MMOL/L (ref 3.5–5.2)
PROT SERPL-MCNC: 7.2 G/DL (ref 6–8.5)
SODIUM SERPL-SCNC: 142 MMOL/L (ref 134–144)
T VAGINALIS RRNA SPEC QL NAA+PROBE: NEGATIVE
TRIGL SERPL-MCNC: 115 MG/DL (ref 0–149)
VLDLC SERPL CALC-MCNC: 20 MG/DL (ref 5–40)

## 2022-09-22 ENCOUNTER — OFFICE VISIT (OUTPATIENT)
Dept: ORTHOPEDIC SURGERY | Age: 52
End: 2022-09-22
Payer: MEDICAID

## 2022-09-22 VITALS — BODY MASS INDEX: 33.61 KG/M2 | TEMPERATURE: 97.7 F | WEIGHT: 178 LBS | HEIGHT: 61 IN

## 2022-09-22 DIAGNOSIS — M25.531 RIGHT WRIST PAIN: ICD-10-CM

## 2022-09-22 DIAGNOSIS — M15.8 DEGENERATIVE ARTHRITIS OF INTERPHALANGEAL JOINT OF RIGHT THUMB: Primary | ICD-10-CM

## 2022-09-22 PROCEDURE — 73140 X-RAY EXAM OF FINGER(S): CPT | Performed by: ORTHOPAEDIC SURGERY

## 2022-09-22 PROCEDURE — 99203 OFFICE O/P NEW LOW 30 MIN: CPT | Performed by: ORTHOPAEDIC SURGERY

## 2022-09-22 NOTE — PROGRESS NOTES
Harmeet Bhakta is a 49 y.o. female right handed . Worker's Compensation and legal considerations: none filed. Vitals:    09/22/22 0805   Temp: 97.7 °F (36.5 °C)   TempSrc: Temporal   Weight: 178 lb (80.7 kg)   Height: 5' 1\" (1.549 m)   PainSc:   6   PainLoc: Finger   LMP: 09/19/2022           Chief Complaint   Patient presents with    Thumb Pain     Right thumb         HPI: Patient presents today with a history of right thumb pain that she localizes to the IP joint. She also reports radiating up the wrist and forearm. Date of onset: 2021    Injury: No    Prior Treatment:  No    Numbness/ Tingling: No      ROS: Review of Systems - General ROS: negative  Psychological ROS: negative  ENT ROS: negative  Allergy and Immunology ROS: negative  Hematological and Lymphatic ROS: negative  Respiratory ROS: no cough, shortness of breath, or wheezing  Cardiovascular ROS: no chest pain or dyspnea on exertion  Gastrointestinal ROS: no abdominal pain, change in bowel habits, or black or bloody stools  Musculoskeletal ROS: negative  Neurological ROS: negative  Dermatological ROS: negative    Past Medical History:   Diagnosis Date    Anxiety     Depression 2014    Fibromyalgia 2010    Sciatica     right       Past Surgical History:   Procedure Laterality Date    HX TONSILLECTOMY  1981       Current Outpatient Medications   Medication Sig Dispense Refill    phentermine (ADIPEX-P) 37.5 mg tablet Take 1 Tablet by mouth every morning. Max Daily Amount: 37.5 mg. 30 Tablet 2    ALPRAZolam (XANAX) 0.5 mg tablet TAKE ONE HALF TO 1 TABLET EVERY DAY AS NEEDED FOR ANXIETY      DULoxetine (CYMBALTA) 60 mg capsule Take 1 Cap by mouth daily. 90 Cap 1    ACETAMINOPHEN (TYLENOL 8 HOUR PO) Take  by mouth.       traZODone (DESYREL) 100 mg tablet TAKE 1 2 TO 1 (ONE HALF TO ONE) TABLET BY MOUTH EVERY DAY AT BEDTIME AS NEEDED FOR INSOMNIA (Patient not taking: Reported on 9/22/2022)         Allergies   Allergen Reactions  Hydrocodone Vertigo    Vicodin [Hydrocodone-Acetaminophen] Itching           PE:     Physical Exam  Vitals and nursing note reviewed. Constitutional:       General: She is not in acute distress. Appearance: Normal appearance. She is not ill-appearing. Cardiovascular:      Pulses: Normal pulses. Pulmonary:      Effort: Pulmonary effort is normal. No respiratory distress. Musculoskeletal:         General: Tenderness present. No swelling, deformity or signs of injury. Normal range of motion. Cervical back: Normal range of motion and neck supple. Right lower leg: No edema. Left lower leg: No edema. Skin:     General: Skin is warm and dry. Capillary Refill: Capillary refill takes less than 2 seconds. Findings: No bruising or erythema. Neurological:      General: No focal deficit present. Mental Status: She is alert and oriented to person, place, and time. Psychiatric:         Mood and Affect: Mood normal.         Behavior: Behavior normal.          Right upper extremity: There are Heberden's nodes noted at the right thumb. These are mildly tender to palpation. There is diffuse tenderness throughout the wrist and distal forearm. Neurovascularly intact distally and range of motion full. Imagin2022 3 views of right thumb does not show any fracture, dislocation, or any other osseous abnormalities. There are early degenerative changes noted at the IP joint with osteophytes noted. There may also be a small loose body about the volar radial aspect of the IP joint. ICD-10-CM ICD-9-CM    1. Degenerative arthritis of interphalangeal joint of right thumb  M15.8 715.94 AMB POC XRAY, FINGER(S), 2+ VIEWS      REFERRAL TO OCCUPATIONAL THERAPY      2. Right wrist pain  M25.531 719.43 REFERRAL TO OCCUPATIONAL THERAPY            Plan:     We discussed possibility of injections and therapy. We also discussed anti-inflammatory.   She has a history of stomach pain when taking Motrin. I advised her to  some Voltaren gel over-the-counter. We will also try occupational therapy for range of motion exercises and other modalities. We may consider an injection in the future. Follow-up and Dispositions    Return if symptoms worsen or fail to improve, for OT F/U and possible injection.           Plan was reviewed with patient, who verbalized agreement and understanding of the plan

## 2022-09-23 ENCOUNTER — TELEPHONE (OUTPATIENT)
Dept: PHYSICAL THERAPY | Age: 52
End: 2022-09-23

## 2022-11-11 ENCOUNTER — HOSPITAL ENCOUNTER (OUTPATIENT)
Dept: PHYSICAL THERAPY | Age: 52
Discharge: HOME | End: 2022-11-11
Attending: ORTHOPAEDIC SURGERY
Payer: MEDICAID

## 2022-11-11 PROCEDURE — 97165 OT EVAL LOW COMPLEX 30 MIN: CPT

## 2022-11-11 NOTE — PROGRESS NOTES
[facility]  [address] [facility] [address]  [facility], [address]  [phone] fax    Plan of Care/Statement of Necessity for Occupational Therapy Services    Patient name: Dona Broderick Start of Care: 2022   Referral source: Hayden Gold DO : 1970    Medical Diagnosis: Pain of right thumb [M79.644]  Pain in right wrist [M25.531]  Payor: BLUE CROSS MEDICAID / Plan: [id number] [facility] / Product Type: Managed Care Medicaid /  Onset Date:    Treatment Diagnosis: right wrist pain   Prior Hospitalization: see medical history Provider#: [id number]   Medications: Verified on Patient summary List    Comorbidities: fibromyalgia    Prior Level of Function: (I) with ADL/IADL tasks without functional limitations and pain using right hand, typing, logistical planning for college students, co-curricular activities          The Plan of Care and following information is based on the information from the initial evaluation. Assessment/ key information: Patient is an ambidextrous 49 y.o. female with a chief complaint  of right wrist and thumb pain beginning in . Pt reports no injury to the right wrist. Imaging revealed degenerative changes to the thumb IP joint and osteophytes. Pt reports she has purchased keyboard wrist pad to ease pain in dorsal wrist and a wrist support however has had minimal relief. She reports increased pain with thumb and wrist movement. Pt presents to skilled OT today rating her pain level 2/10 in the right wrist at rest.  There is extrinsic tightness noted with resting position of hand with digits in flexion. There is tenderness with palpation to the dorsal forearm and the second dorsal compartment. Discoloration noted in the right hand palm compared to the left hand palm and her right hand is cold in comparison. Her right thumb AROM is limited to 54 deg flexion and a 5 deg extension lag noted.   Her right wrist is limited to 46 deg flexion and 54 deg extension, and limited forearm rotation with 55 deg supination. She is unable to tuck her right thumb into a fist demonstrating a positive Finkelstein's test. Her complaints and limitations correlate with deQuervain's tenosynovitis or intersection syndrome. Patient received an initial evaluation today followed by education as to diagnosis, precautions and treatment plan. Patient was recommended to purchase a thumb and wrist spica to be worn continuously. Patient will continue to benefit from skilled OT services to modify and progress therapeutic interventions, address ROM deficits, address strength deficits, analyze and address soft tissue restrictions, analyze and cue movement patterns, analyze and modify body mechanics/ergonomics, and instruct in home and community integration to attain goals. Evaluation Complexity: History LOW Complexity : Brief history review  Examination LOW Complexity : 1-3 performance deficits relating to physical, cognitive , or psychosocial skils that result in activity limitations and / or participation restrictions  Clinical Decision Making MEDIUM Complexity : Patient may present with comorbidities that affect occupational performnce.  Miniml to moderate modification of tasks or assistance (eg, physical or verbal ) with assesment(s) is necessary to enable patient to complete evaluation   Overall Complexity Rating: LOW   Patient would benefit from OT/Hand therapy services for the following problems:  Problem List: Pain effecting function, Decreased range of motion, Decreased strength, Edema effecting function, Decreased coordination/prehension, Decreased ADL/functional abilities , Decreased activity tolerance, Decreased flexibility/joint mobility, Decreased transfer abilities, and Sensability              Treatment Plan may include any combination of the following: Therapeutic exercise, Therapeutic activities, Neuromuscular re-education, Physical agent/modality, Manual therapy, Splinting/orthoses, Patient education, and ADLs/IADLs  Patient / Family readiness to learn indicated by: asking questions, trying to perform skills, and interest  Persons(s) to be included in education:   patient (P)  Barriers to Learning/Limitations: None  Patient Goal (s): pain relief or deeper understanding of how to deal with it.   Patient Self Reported Health Status: good  Rehabilitation Potential: good  Short Term Goals: To be accomplished in 2  weeks:  Goal:* Patient will demonstrate a good understanding of their condition and strategies for self-management. Status at Eval: pt educated on prognosis, diagnosis, activity modifications, treatment plan, wrist and thumb spica wear, heat modalities      Long Term Goals: To be accomplished in 4 weeks:              Goal:* Pt will report reduced pain at rest, 0/10, in order to begin using right hand for normal daily tasks. Status at eval: 2-3/10     Goal:* Pt will be (I) with strategies for self mgmt of symptoms in order to reduce pain with functional use of the right UE. Status at eval: pt briefly educated on prognosis, diagnosis, activity modifications, treatment plan, wrist and thumb spica wear, heat modalities      Goal:* Patient will have 65 degrees of right wrist extension in order to increase ease with ADL's such as bathing, eating and dressing and to eventually bear weight thru the right upper extremity as in pushing up from a chair. Status at Eval: 54 deg      Goal:* Patient will have 55 degrees of right wrist flexion in order to perform hygiene tasks and /or work with tools such as a screwdriver. Status at Eval: 46 deg      Goal:* Patient will have 65 degrees of right forearm supination in order to perform ADL's including washing face and accepting change.    Status at Eval: 55 deg     Frequency / Duration: Patient to be seen 2 times per week for 4 weeks:  Patient/ Caregiver education and instruction: Diagnosis, prognosis, self care, activity modification, brace/ splint application, and exercises  [x]  Plan of care has been reviewed with Helene Osgood, OT 11/11/2022 4:56 PM  ________________________________________________________________________    I certify that the above Therapy Services are being furnished while the patient is under my care. I agree with the treatment plan and certify that this therapy is necessary.     [address] [facility] Signature:____________Date:_________TIME:________     Cameron Lucero DO  ** Signature, Date and Time must be completed for valid certification **    Please sign and return to [facility] [address] [place] [address] & [facility]  [address] [place] [address], [facility] [address]  [phone] fax

## 2022-11-11 NOTE — PROGRESS NOTES
Hand Therapy Evaluation and Daily Note    Patient Name: Jabier Jimenez  Date:2022  : 1970  Age: 49 y.o.y/o  [x]  Patient  Verified  Payor: BLUE CROSS MEDICAID / Plan: [facility] [employer] / Product Type: Managed Care Medicaid /    Referring Provider: DO Suzy Hamm MD Visit:  none scheduled  Onset Date:    Surgical Date: na  Surgical Procedure: na    In time:2:36 PM  Out time:3:19 PM  Total Treatment Time (min): 43  Total Timed Codes (min): 18  1:1 Treatment Time (MC only): 43   Visit #: 1 of 8    Treatment Area: Pain of right thumb [M79.644]  Pain in right wrist [M25.531]    Precautions:    Hand Dominance: ambidextrous   Hand Involved: right    Total Evaluation Time:  25    History of Present Condition:  Patient is an ambidextrous 49 y.o. female with a chief complaint  of right wrist and thumb pain beginning in . Pt reports she has purchased keyboard wrist pad to ease pain in dorsal wrist and a wrist support however has had minimal relief. She reports increased pain with thumb and wrist movement. Pain Rating:   Current: (0-no pain 10-debilitating pain) mild   At best: (0-no pain 10-debilitating pain) mild  At worst: (0-no pain 10-debilitating pain) severe  Location: right hand, right thumb, and right wrist  Type:  intermittent   Better with: immobility   Worse with: driving and over use    Medications/Allergies/Past Medical History:  See chart; reviewed with patient. Fibromyalgia    Diagnostic Tests: Imagin/22/2022 3 views of right thumb does not show any fracture, dislocation, or any other osseous abnormalities. There are early degenerative changes noted at the IP joint with osteophytes noted. There may also be a small loose body about the volar radial aspect of the IP joint.        Prior Level of Function: (I) with ADL/IADL tasks without functional limitations and pain using right hand, typing, logistical planning for college students, co-curricular activities      Current Level of Function:  pain with use of right thumb and wrist, (I) with self care with pain and functional limitations     Social History: Pt lives with daughter and grand daughter     Occupation/Job Requirements:  - typing    Observation: coldness, different coloration in skin, poor cap refill, extrinsic tightness  Scar/incision:   na  Location:  right palm      Palpation:  tenderness with palpation to dorsal forearm - intersection,     Range of Motion:   THUMB ROM CHART as measured in degrees  Thumb, Side  Active/Passive Date:  11/11/2022  Right/Left   MP -5 - 54 / 0-64   IP 0-50/0-40   Radial Abd. 25/35   Palmar Abd. Opposition WFL       Range of Motion:   Elbow/Wrist   Wrist 11/11/2022  Right/Left       Flex 46/63       Ext 54/71       UD 25/40       RD 20/27       FA         Pro 87/85       Sup 55/80       Elbow        Flex        Ext          Strength:  NT    Sensation:    intact    Edema: GIRTH CHART measured in cm  Date: 11/11/2022     Side Right/Left    DPC circum.  19.7/20.7    Wrist Crease 16.2/16.4    FA      Elbow           Special Tests:     Provocative test: Richard Barnardon test - positive    ADLs  Feeding:        []MaxA   []ModA   []Shira   [] CGA   []SBA   []Kirill   [x]Independent  UE Dressing:       []MaxA   []ModA   []Shira   [] CGA   []SBA   []Kirill   [x]Independent  LE Dressing:       []MaxA   []ModA   []Shira   [] CGA   []SBA   []Kirill   [x]Independent  Grooming:       []MaxA   []ModA   []Shira   [] CGA   []SBA   []Kirill   [x]Independent  Toileting:       []MaxA   []ModA   []Shira   [] CGA   []SBA   []Kirill   []Independent  Bathing:       []MaxA   []ModA   []Shira   [] CGA   []SBA   []Kirill   [x]Independent  Light Meal Prep:    []MaxA   []ModA   []Shira   [] CGA   []SBA   []Kirill   [x]Independent  Household/Other: []MaxA   []ModA   []Shira   [] CGA   []SBA   []Kirill   [x]Independent  Adaptive Equip:     []MaxA   []ModA   []Shira   [] CGA   []SBA   []Kirill []Independent  Driving:       []MaxA   []ModA   []Shira   [] CGA   []SBA   []Kirill   [x]Independent      Todays Treatment:  Patient received an initial evaluation today followed by education as to diagnosis, precautions and treatment plan. Patient was recommended to purchase a thumb and wrist spica to be worn continuously.      OBJECTIVE  Modality rationale: decrease pain and increase tissue extensibility to improve the patients ability to functionally use right wrist.    Min Type Additional Details    [] Estim:  []Unatt       []IFC  []Premod                        []Other:  []w/ice   []w/heat  Position:  Location:    [] Estim: []Att    []TENS instruct  []NMES                    []Other:  []w/US   []w/ice   []w/heat  Position:  Location:    []  Traction: [] Cervical       []Lumbar                       [] Prone          []Supine                       []Intermittent   []Continuous Lbs:  [] before manual  [] after manual    []  Ultrasound: []Continuous   [] Pulsed                           []1MHz   []3MHz W/cm2:  Location:    []  Iontophoresis with dexamethasone         Location: [] Take home patch   [] In clinic   5 concurrent with self care []  Ice     [x]  Heat MHP  []  Ice massage  []  Laser   []  Paraffin Position: seated, resting  Location: right hand/wrist    []  Laser with stim  []  Other:  Position:  Location:    []  Vasopneumatic Device Pressure:       [] lo [] med [] hi   Temperature: [] lo [] med [] hi       [x] Skin assessment post-treatment:  [x]intact [x]redness- no adverse reaction    18 min Self Care/Home Management: prognosis, diagnosis, activity modifications, treatment plan, wrist and thumb spica wear, heat modalities    Rationale:  education   to improve the patients ability to reduce symptoms in right UE    With   [] TE   [] TA   [] neuro   [] other: Patient Education: [x] Review HEP    [] Progressed/Changed HEP based on:   [] positioning   [] body mechanics   [] transfers   [] heat/ice application   [] Splint wear/care   [] Sensory re-education   [] scar management      [] other:      Pain Level (0-10 scale) post treatment: 1/10    Patient will continue to benefit from skilled OT services to modify and progress therapeutic interventions, address ROM deficits, address strength deficits, analyze and address soft tissue restrictions, analyze and cue movement patterns, analyze and modify body mechanics/ergonomics, and instruct in home and community integration to attain goals. Assessment: Pt presents to skilled OT today rating her pain level 2/10 in the right wrist at rest.  There is extrinsic tightness noted with resting position of hand with digits in flexion. There is tenderness with palpation to the dorsal forearm and the second dorsal compartment. Discoloration noted in the right hand palm compared to the left hand palm and her right hand is cold in comparison. Her right thumb AROM is limited to 54 deg flexion and a 5 deg extension lag noted. Her right wrist is limited to 46 deg flexion and 54 deg extension, and limited forearm rotation with 55 deg supination. She is unable to tuck her right thumb into a fist demonstrating a positive Finkelstein's test. Her complaints and limitations correlate with deQuervain's tenosynovitis or intersection syndrome. Evaluation Complexity: History LOW Complexity : Brief history review  Examination LOW Complexity : 1-3 performance deficits relating to physical, cognitive , or psychosocial skils that result in activity limitations and / or participation restrictions  Clinical Decision Making MEDIUM Complexity : Patient may present with comorbidities that affect occupational performnce.  Miniml to moderate modification of tasks or assistance (eg, physical or verbal ) with assesment(s) is necessary to enable patient to complete evaluation   Overall Complexity Rating: LOW   Patient would benefit from OT/Hand therapy services for the following problems:  Problem List: Pain effecting function, Decreased range of motion, Decreased strength, Edema effecting function, Decreased coordination/prehension, Decreased ADL/functional abilities , Decreased activity tolerance, Decreased flexibility/joint mobility, Decreased transfer abilities, and Sensability   Treatment Plan may include any combination of the following: Therapeutic exercise, Therapeutic activities, Neuromuscular re-education, Physical agent/modality, Manual therapy, Splinting/orthoses, Patient education, and ADLs/IADLs  Patient / Family readiness to learn indicated by: asking questions, trying to perform skills, and interest  Persons(s) to be included in education:   patient (P)  Barriers to Learning/Limitations: None  Patient Goal (s): pain relief or deeper understanding of how to deal with it.   Patient Self Reported Health Status: good  Rehabilitation Potential: good  Short Term Goals: To be accomplished in 2  weeks:  Goal:* Patient will demonstrate a good understanding of their condition and strategies for self-management. Status at Eval: pt educated on prognosis, diagnosis, activity modifications, treatment plan, wrist and thumb spica wear, heat modalities     Long Term Goals: To be accomplished in 4 weeks:   Goal:* Pt will report reduced pain at rest, 0/10, in order to begin using right hand for normal daily tasks. Status at eval: 2-3/10    Goal:* Pt will be (I) with strategies for self mgmt of symptoms in order to reduce pain with functional use of the right UE. Status at eval: pt briefly educated on prognosis, diagnosis, activity modifications, treatment plan, wrist and thumb spica wear, heat modalities     Goal:* Patient will have 65 degrees of right wrist extension in order to increase ease with ADL's such as bathing, eating and dressing and to eventually bear weight thru the right upper extremity as in pushing up from a chair.   Status at Eval: 54 deg     Goal:* Patient will have 54 degrees of right wrist flexion in order to perform hygiene tasks and /or work with tools such as a screwdriver. Status at Eval: 46 deg     Goal:* Patient will have 65 degrees of right forearm supination in order to perform ADL's including washing face and accepting change.    Status at Eval: 55 deg    Frequency / Duration: Patient to be seen 2 times per week for 4 weeks:  Patient/ Caregiver education and instruction: Diagnosis, prognosis, self care, activity modification, brace/ splint application, and exercises    Kennedy Marcos OT, 11/11/2022 2:37 PM

## 2022-12-12 ENCOUNTER — TELEPHONE (OUTPATIENT)
Dept: PHYSICAL THERAPY | Age: 52
End: 2022-12-12

## 2022-12-15 ENCOUNTER — TELEPHONE (OUTPATIENT)
Dept: PHYSICAL THERAPY | Age: 52
End: 2022-12-15

## 2022-12-29 ENCOUNTER — APPOINTMENT (OUTPATIENT)
Dept: PHYSICAL THERAPY | Age: 52
End: 2022-12-29
Attending: ORTHOPAEDIC SURGERY

## 2023-01-03 ENCOUNTER — TELEPHONE (OUTPATIENT)
Dept: PHYSICAL THERAPY | Age: 53
End: 2023-01-03

## 2023-01-06 NOTE — PROGRESS NOTES
[facility]  [address] [facility] [address]  [place], [address]  [phone] fax    Occupational Therapy Discharge Summary    Patient name: Madhu Graham Start of Care: 2022   Referral source: Marcelo HALL DO : 1970   Medical/Treatment Diagnosis: Pain of right thumb [M79.644]  Pain in right wrist [M25.531]  Payor: BLUE CROSS MEDICAID / Plan: [id number] [place] [address] / Product Type: Managed Care Medicaid /  Onset Date:     Prior Hospitalization: see medical history Provider#: [id number]   Medications: Verified on Patient Summary List     Comorbidities: fibromyalgia    Prior Level of Function: (I) with ADL/IADL tasks without functional limitations and pain using right hand, typing, logistical planning for college students, co-curricular activities                                     Visits from Start of Care: 1    Missed Visits: 4  Reporting Period : 2022 to 2022    Summary of Care:  Short Term Goals: To be accomplished in 2  weeks:  Goal:* Patient will demonstrate a good understanding of their condition and strategies for self-management. Status at Eval: pt educated on prognosis, diagnosis, activity modifications, treatment plan, wrist and thumb spica wear, heat modalities      Long Term Goals: To be accomplished in 4 weeks:              Goal:* Pt will report reduced pain at rest, 0/10, in order to begin using right hand for normal daily tasks. Status at eval: -3/10     Goal:* Pt will be (I) with strategies for self mgmt of symptoms in order to reduce pain with functional use of the right UE.   Status at eval: pt briefly educated on prognosis, diagnosis, activity modifications, treatment plan, wrist and thumb spica wear, heat modalities      Goal:* Patient will have 65 degrees of right wrist extension in order to increase ease with ADL's such as bathing, eating and dressing and to eventually bear weight thru the right upper extremity as in pushing up from a chair. Status at Eval: 54 deg      Goal:* Patient will have 55 degrees of right wrist flexion in order to perform hygiene tasks and /or work with tools such as a screwdriver. Status at Eval: 46 deg      Goal:* Patient will have 65 degrees of right forearm supination in order to perform ADL's including washing face and accepting change. Status at Eval: 55 deg    ASSESSMENT/RECOMMENDATIONS:Unable to further assess goals due to pt abdicated therapy. Pt violated cx/ns policy x4. Called patient to schedule follow-ups and pt reported she would call back to schedule. Pt has failed to return to skilled OT and it has been greater than 30 days since this patient was last seen at this facility. Will d/c at this time and reassess in the future if medically necessary. Thank you for this referral.   [x]Discontinue therapy: []Patient has reached or is progressing toward set goals      [x]Patient is non-compliant or has abdicated      []Due to lack of appreciable progress towards set goals    Lian Zhang OT 1/6/2023 11:27 AM    NOTE TO PHYSICIAN:  Please complete the following and fax to: [facility] at [phone]  . Retain this original for your records. If you are unable to process this request in   24 hours, please contact our office.      [] I have read the above report and request that my patient continue therapy with the following changes/special instructions:  [] I have read the above report and request that my patient be discharged from therapy    Physician's Signature:____________Date:_________TIME:________     Johnathon Res, DO  ** Signature, Date and Time must be completed for valid certification **

## 2023-03-09 ENCOUNTER — OFFICE VISIT (OUTPATIENT)
Age: 53
End: 2023-03-09
Payer: MEDICAID

## 2023-03-09 VITALS
HEIGHT: 61 IN | OXYGEN SATURATION: 100 % | RESPIRATION RATE: 16 BRPM | WEIGHT: 178.8 LBS | TEMPERATURE: 97.2 F | SYSTOLIC BLOOD PRESSURE: 139 MMHG | DIASTOLIC BLOOD PRESSURE: 70 MMHG | BODY MASS INDEX: 33.76 KG/M2 | HEART RATE: 80 BPM

## 2023-03-09 DIAGNOSIS — R07.89 CHEST WALL PAIN: ICD-10-CM

## 2023-03-09 DIAGNOSIS — S16.1XXA STRAIN OF NECK MUSCLE, INITIAL ENCOUNTER: Primary | ICD-10-CM

## 2023-03-09 DIAGNOSIS — M79.671 RIGHT FOOT PAIN: ICD-10-CM

## 2023-03-09 PROCEDURE — 99214 OFFICE O/P EST MOD 30 MIN: CPT | Performed by: FAMILY MEDICINE

## 2023-03-09 RX ORDER — PHENTERMINE HYDROCHLORIDE 37.5 MG/1
37.5 TABLET ORAL
Status: CANCELLED | OUTPATIENT
Start: 2023-03-09

## 2023-03-09 RX ORDER — LIDOCAINE 50 MG/G
PATCH TOPICAL
COMMUNITY
Start: 2023-03-05 | End: 2023-03-09 | Stop reason: ALTCHOICE

## 2023-03-09 RX ORDER — LOPERAMIDE HYDROCHLORIDE 2 MG/1
TABLET ORAL EVERY 4 HOURS PRN
COMMUNITY
Start: 2015-12-04 | End: 2023-03-09 | Stop reason: SDUPTHER

## 2023-03-09 RX ORDER — DULOXETIN HYDROCHLORIDE 30 MG/1
CAPSULE, DELAYED RELEASE ORAL
COMMUNITY
Start: 2023-02-06

## 2023-03-09 RX ORDER — PROMETHAZINE HYDROCHLORIDE 25 MG/1
25 TABLET ORAL 3 TIMES DAILY PRN
Qty: 12 TABLET | Refills: 0 | Status: SHIPPED | OUTPATIENT
Start: 2023-03-09 | End: 2023-03-16

## 2023-03-09 RX ORDER — NAPROXEN 500 MG/1
TABLET ORAL
COMMUNITY
Start: 2023-03-06

## 2023-03-09 RX ORDER — TRAZODONE HYDROCHLORIDE 100 MG/1
250 TABLET ORAL AS NEEDED
COMMUNITY
Start: 2023-03-06 | End: 2023-03-16

## 2023-03-09 RX ORDER — ACETAMINOPHEN 500 MG/1
TABLET ORAL
COMMUNITY
Start: 2023-03-06

## 2023-03-09 SDOH — ECONOMIC STABILITY: INCOME INSECURITY: HOW HARD IS IT FOR YOU TO PAY FOR THE VERY BASICS LIKE FOOD, HOUSING, MEDICAL CARE, AND HEATING?: HARD

## 2023-03-09 SDOH — ECONOMIC STABILITY: FOOD INSECURITY: WITHIN THE PAST 12 MONTHS, YOU WORRIED THAT YOUR FOOD WOULD RUN OUT BEFORE YOU GOT MONEY TO BUY MORE.: SOMETIMES TRUE

## 2023-03-09 SDOH — ECONOMIC STABILITY: FOOD INSECURITY: WITHIN THE PAST 12 MONTHS, THE FOOD YOU BOUGHT JUST DIDN'T LAST AND YOU DIDN'T HAVE MONEY TO GET MORE.: SOMETIMES TRUE

## 2023-03-09 SDOH — ECONOMIC STABILITY: HOUSING INSECURITY
IN THE LAST 12 MONTHS, WAS THERE A TIME WHEN YOU DID NOT HAVE A STEADY PLACE TO SLEEP OR SLEPT IN A SHELTER (INCLUDING NOW)?: NO

## 2023-03-09 ASSESSMENT — PATIENT HEALTH QUESTIONNAIRE - PHQ9
SUM OF ALL RESPONSES TO PHQ9 QUESTIONS 1 & 2: 2
SUM OF ALL RESPONSES TO PHQ QUESTIONS 1-9: 2
SUM OF ALL RESPONSES TO PHQ QUESTIONS 1-9: 2
1. LITTLE INTEREST OR PLEASURE IN DOING THINGS: 0
2. FEELING DOWN, DEPRESSED OR HOPELESS: 2
SUM OF ALL RESPONSES TO PHQ QUESTIONS 1-9: 2
SUM OF ALL RESPONSES TO PHQ QUESTIONS 1-9: 2

## 2023-03-09 NOTE — PROGRESS NOTES
1. \"Have you been to the ER, urgent care clinic since your last visit? Hospitalized since your last visit? \"  Yes 3/05/23- kenneth [place] PILAR    2. \"Have you seen or consulted any other health care providers outside of the [address] [place] [address] since your last visit? \" No     3. For patients aged 39-70: Has the patient had a colonoscopy / FIT/ Cologuard? No, set for 5/28/23      If the patient is female:    4. For patients aged 41-77: Has the patient had a mammogram within the past 2 years? Yes - Care Gap present. Most recent result on file, Drive through [facility]      5. For patients aged 21-65: Has the patient had a pap smear? Yes - Care Gap present.  Most recent result on file
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
## 2023-03-10 ENCOUNTER — HOSPITAL ENCOUNTER (EMERGENCY)
Facility: HOSPITAL | Age: 53
Discharge: HOME | End: 2023-03-11
Attending: EMERGENCY MEDICINE
Payer: MEDICAID

## 2023-03-10 DIAGNOSIS — T78.40XA ALLERGIC REACTION, INITIAL ENCOUNTER: Primary | ICD-10-CM

## 2023-03-10 PROCEDURE — A4216 STERILE WATER/SALINE, 10 ML: HCPCS | Performed by: EMERGENCY MEDICINE

## 2023-03-10 PROCEDURE — 2500000003 HC RX 250 WO HCPCS: Performed by: EMERGENCY MEDICINE

## 2023-03-10 PROCEDURE — 2580000003 HC RX 258: Performed by: EMERGENCY MEDICINE

## 2023-03-10 PROCEDURE — 6360000002 HC RX W HCPCS: Performed by: EMERGENCY MEDICINE

## 2023-03-10 PROCEDURE — 99284 EMERGENCY DEPT VISIT MOD MDM: CPT

## 2023-03-10 PROCEDURE — 96374 THER/PROPH/DIAG INJ IV PUSH: CPT

## 2023-03-10 PROCEDURE — 96375 TX/PRO/DX INJ NEW DRUG ADDON: CPT

## 2023-03-10 RX ORDER — DIPHENHYDRAMINE HYDROCHLORIDE 50 MG/ML
25 INJECTION INTRAMUSCULAR; INTRAVENOUS
Status: COMPLETED | OUTPATIENT
Start: 2023-03-10 | End: 2023-03-10

## 2023-03-10 RX ORDER — METHYLPREDNISOLONE SODIUM SUCCINATE 40 MG/ML
40 INJECTION, POWDER, LYOPHILIZED, FOR SOLUTION INTRAMUSCULAR; INTRAVENOUS
Status: COMPLETED | OUTPATIENT
Start: 2023-03-10 | End: 2023-03-10

## 2023-03-10 RX ADMIN — FAMOTIDINE 20 MG: 10 INJECTION, SOLUTION INTRAVENOUS at 23:34

## 2023-03-10 RX ADMIN — DIPHENHYDRAMINE HYDROCHLORIDE 25 MG: 50 INJECTION, SOLUTION INTRAMUSCULAR; INTRAVENOUS at 23:34

## 2023-03-10 RX ADMIN — METHYLPREDNISOLONE SODIUM SUCCINATE 40 MG: 40 INJECTION, POWDER, FOR SOLUTION INTRAMUSCULAR; INTRAVENOUS at 23:33

## 2023-03-10 ASSESSMENT — PAIN - FUNCTIONAL ASSESSMENT: PAIN_FUNCTIONAL_ASSESSMENT: NONE - DENIES PAIN

## 2023-03-11 VITALS
DIASTOLIC BLOOD PRESSURE: 79 MMHG | HEART RATE: 96 BPM | HEIGHT: 60 IN | BODY MASS INDEX: 34.55 KG/M2 | WEIGHT: 176 LBS | SYSTOLIC BLOOD PRESSURE: 121 MMHG | OXYGEN SATURATION: 99 % | RESPIRATION RATE: 18 BRPM | TEMPERATURE: 98.1 F

## 2023-03-11 RX ORDER — FAMOTIDINE 20 MG/1
20 TABLET, FILM COATED ORAL 2 TIMES DAILY
Qty: 30 TABLET | Refills: 1 | Status: SHIPPED | OUTPATIENT
Start: 2023-03-11

## 2023-03-11 RX ORDER — DIPHENHYDRAMINE HCL 25 MG
50 CAPSULE ORAL EVERY 6 HOURS PRN
Qty: 30 CAPSULE | Refills: 0 | Status: SHIPPED | OUTPATIENT
Start: 2023-03-11 | End: 2023-03-21

## 2023-03-11 RX ORDER — METHYLPREDNISOLONE 4 MG/1
TABLET ORAL
Qty: 1 KIT | Refills: 0 | Status: SHIPPED | OUTPATIENT
Start: 2023-03-11 | End: 2023-03-16

## 2023-03-11 NOTE — PROGRESS NOTES
Patient discharge instructions given with patient verbalizing understanding, VSS, IV discontinued with catheter intact.

## 2023-03-11 NOTE — ED TRIAGE NOTES
A&O female with c/o rash and itching, swelling of face. Took Benadryl around 2000 without any relief. States she feels like throat is tight. Talking in full sentences.

## 2023-03-15 ENCOUNTER — PATIENT MESSAGE (OUTPATIENT)
Age: 53
End: 2023-03-15

## 2023-03-15 DIAGNOSIS — T78.40XA ALLERGIC REACTION, INITIAL ENCOUNTER: Primary | ICD-10-CM

## 2023-03-15 DIAGNOSIS — T78.2XXA ANAPHYLAXIS, INITIAL ENCOUNTER: ICD-10-CM

## 2023-03-15 NOTE — TELEPHONE ENCOUNTER
From: Fabricio Baires  To: Dr. Angie Poole: 3/15/2023 4:27 PM EDT  Subject: Emergency Room Visit March 5th    Good Afternoon,  I was taken to the Emergency Room March 5th with my throat closing, trouble breathing and the DX was a severe allergic reaction to [facility]. I was advised by the Dr on duty to get an appointment with you for allergy testing as this reaction may be a new reaction to something that is a staple in my home etc. It really scared me please advise when / where I am able to get tested .   Thank you  Fabricio Baires  [phone] [email or web address]

## 2023-03-16 ENCOUNTER — PATIENT MESSAGE (OUTPATIENT)
Age: 53
End: 2023-03-16

## 2023-03-16 ENCOUNTER — TELEMEDICINE (OUTPATIENT)
Age: 53
End: 2023-03-16
Payer: MEDICAID

## 2023-03-16 DIAGNOSIS — T78.2XXA ANAPHYLAXIS, INITIAL ENCOUNTER: Primary | ICD-10-CM

## 2023-03-16 PROCEDURE — 99214 OFFICE O/P EST MOD 30 MIN: CPT | Performed by: FAMILY MEDICINE

## 2023-03-16 NOTE — PROGRESS NOTES
Lydia Phelps (:  1970) is a Established patient, here for evaluation of the following:        Lydia Phelps, was evaluated through a synchronous (real-time) audio-video encounter. The patient (or guardian if applicable) is aware that this is a billable service, which includes applicable co-pays. This Virtual Visit was conducted with patient's (and/or legal guardian's) consent. The visit was conducted pursuant to the emergency declaration under the [place]iet[place] Act and the National Emergencies Act, 1135 waiver authority and the Coronavirus Preparedness and Response Supplemental Appropriations Act.  Patient identification was verified, and a caregiver was present when appropriate.   The patient was located at Home: [address] [place] [id number]  Provider was located at Facility (Appt Dept): [address], [facility] [address]  [place] [address]         --Sophia Dunbar MA

## 2023-03-16 NOTE — PROGRESS NOTES
Noticed a wound on 3/16/2023    TELEHEALTH EVALUATION -- Audio/Visual (During DUG-50 public health emergency)    HPI:    Mathis Aschoff (:  1970) has requested an audio/video evaluation for the following concern(s):    Anaphylactic reaction. Patient was recently seen in the emergency department after having an allergic reaction. She states her symptoms felt like her throat was closing up such as if she had glue in her throat. She denies having had any new food products though she did recently have a cold hearing but this was not a new food for her. She has held the other medications that she was previously taking. She is in need of an EpiPen. Review of Systems as per HPI    Prior to Visit Medications    Medication Sig Taking? Authorizing Provider   EPINEPHrine (EPIPEN 2-ALIS) 0.3 MG/0.3ML SOAJ injection Inject 0.3 mLs into the muscle once as needed (anaphylaxis) Use as directed for allergic reaction Yes Brant Snow MD   famotidine (PEPCID) 20 MG tablet Take 1 tablet by mouth 2 times daily Yes Rick Simental MD   EQ PAIN RELIEVER 500 MG tablet TAKE 2 TABLETS BY MOUTH EVERY 8 HOURS AS NEEDED Yes Historical Provider, MD   DULoxetine (CYMBALTA) 30 MG extended release capsule TAKE 1 CAPSULE BY MOUTH IN THE MORNING -30 DAYS FOR QUANTITY OF 7 Yes Historical Provider, MD   naproxen (NAPROSYN) 500 MG tablet TAKE 1 TABLET BY MOUTH TWICE DAILY AS NEEDED Yes Historical Provider, MD       Social History     Tobacco Use    Smoking status: Former     Packs/day: 0.04     Years: 2.00     Pack years: 0.08     Types: Cigarettes     Quit date: 2015     Years since quittin.2    Smokeless tobacco: Never   Vaping Use    Vaping Use: Never used   Substance Use Topics    Alcohol use:  Yes     Alcohol/week: 1.0 - 2.0 standard drink     Comment: Occasional drinking (Red Wine)    Drug use: No        Allergies   Allergen Reactions    Hydrocodone Dizziness or Vertigo    Hydrocodone-Acetaminophen Itching

## 2023-03-16 NOTE — TELEPHONE ENCOUNTER
Regarding: Emergency Room Visit March 5th  ----- Message from Dirk [facility]ikh [facility] sent at 3/15/2023  5:08 PM EDT -----  Please schedule patient for ER Follow-up For Allergies     ----- Message sent from Dirk Shaikh [facility] to Janessa Mehta at 3/15/2023  4:55 PM -----   Good afternoon,     We can place an Allergy Referral in the system per Dr. Loco Souza would like for you to still make a follow-up appointment to be seen in the clinic, thank you.      ----- Message -----       From:Lydia Ivrin       Sent:3/15/2023  4:27 PM EDT         To:Dr. Carlos Gibbs    Subject:Emergency Room Visit March 5th    Good Afternoon,  I was taken to the Emergency Room March 5th with my throat closing, trouble breathing and the DX was a severe allergic reaction to [facility]. I was advised by the Dr on duty to get an appointment with you for allergy testing as this reaction may be a new reaction to something that is a staple in my home etc.   It really scared me please advise when / where I am able to get tested .   Thank you  Aditya Galdamez  [phone] [email or web address]
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
## 2023-03-17 NOTE — TELEPHONE ENCOUNTER
From: Hans Bauer  To: Dr. Corazon Gayle: 3/16/2023 6:53 PM EDT  Subject: Evisit    I have been in the Evisit since 6:24p.m. please advise if you want to reschedule or continue to wait.   Thank You  Hans Bauer [phone]

## 2023-03-17 NOTE — TELEPHONE ENCOUNTER
Notes, provider must have been running behind. Patient checked out at [phone] for virtual appointment 03/16/2023, thank you.

## 2023-03-26 RX ORDER — EPINEPHRINE 0.3 MG/.3ML
0.3 INJECTION SUBCUTANEOUS
Qty: 1 EACH | Refills: 0 | Status: SHIPPED | OUTPATIENT
Start: 2023-03-26 | End: 2023-03-26

## 2023-03-27 ENCOUNTER — HOSPITAL ENCOUNTER (OUTPATIENT)
Facility: HOSPITAL | Age: 53
Setting detail: RECURRING SERIES
Discharge: HOME | End: 2023-03-30
Payer: MEDICAID

## 2023-03-27 PROCEDURE — 97161 PT EVAL LOW COMPLEX 20 MIN: CPT
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
## 2023-03-30 ENCOUNTER — TELEPHONE (OUTPATIENT)
Age: 53
End: 2023-03-30

## 2023-03-30 NOTE — TELEPHONE ENCOUNTER
Left patient voicemail to contact office to schedule follow appointment     follow-up in 3 to 4 months for allergic reaction.

## 2023-05-22 ENCOUNTER — PATIENT MESSAGE (OUTPATIENT)
Age: 53
End: 2023-05-22

## 2023-05-22 NOTE — TELEPHONE ENCOUNTER
Last Visit: 03/16/2023   Next Appointment: N/A     Requested Prescriptions     Pending Prescriptions Disp Refills    EPINEPHrine (EPIPEN 2-ALIS) 0.3 MG/0.3ML SOAJ injection 1 each 0     Sig: Inject 0.3 mLs into the muscle once as needed (anaphylaxis) Use as directed for allergic reaction
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
## 2023-05-24 NOTE — TELEPHONE ENCOUNTER
The patient notified via [employer] that the provider to refer patient to an Allergist and wants to know what PT Facility would you like to be referred to that you would consider to be close by? If you find one please let us know the information listed below, thank you.        Name of Bertrand Pulido  Phone number  Fax number

## 2023-05-25 ENCOUNTER — TELEPHONE (OUTPATIENT)
Age: 53
End: 2023-05-25

## 2023-05-26 RX ORDER — EPINEPHRINE 0.3 MG/.3ML
0.3 INJECTION SUBCUTANEOUS
Qty: 1 EACH | Refills: 0 | Status: SHIPPED | OUTPATIENT
Start: 2023-05-26 | End: 2023-05-26

## 2023-07-27 ENCOUNTER — TELEPHONE (OUTPATIENT)
Age: 53
End: 2023-07-27

## 2023-07-27 NOTE — TELEPHONE ENCOUNTER
Tonia Langley office called to verify if request for records has been recvd, adv not showing as recvd, states will refax and verified number

## 2023-11-06 ENCOUNTER — OFFICE VISIT (OUTPATIENT)
Age: 53
End: 2023-11-06
Payer: COMMERCIAL

## 2023-11-06 VITALS
TEMPERATURE: 97.7 F | HEART RATE: 61 BPM | HEIGHT: 60 IN | SYSTOLIC BLOOD PRESSURE: 124 MMHG | DIASTOLIC BLOOD PRESSURE: 61 MMHG | WEIGHT: 175.2 LBS | OXYGEN SATURATION: 98 % | RESPIRATION RATE: 16 BRPM | BODY MASS INDEX: 34.4 KG/M2

## 2023-11-06 DIAGNOSIS — F41.9 ANXIETY AND DEPRESSION: Primary | ICD-10-CM

## 2023-11-06 DIAGNOSIS — E66.09 CLASS 1 OBESITY DUE TO EXCESS CALORIES WITHOUT SERIOUS COMORBIDITY WITH BODY MASS INDEX (BMI) OF 33.0 TO 33.9 IN ADULT: ICD-10-CM

## 2023-11-06 DIAGNOSIS — M79.7 FIBROMYALGIA: ICD-10-CM

## 2023-11-06 DIAGNOSIS — F32.A ANXIETY AND DEPRESSION: Primary | ICD-10-CM

## 2023-11-06 PROCEDURE — 99214 OFFICE O/P EST MOD 30 MIN: CPT | Performed by: FAMILY MEDICINE

## 2023-11-06 RX ORDER — DIPHENHYDRAMINE HCL 25 MG
25 CAPSULE ORAL EVERY 6 HOURS PRN
COMMUNITY

## 2023-11-06 RX ORDER — METHYLPREDNISOLONE 4 MG/1
TABLET ORAL
COMMUNITY
Start: 2023-09-14 | End: 2023-11-06

## 2023-11-06 RX ORDER — DULOXETIN HYDROCHLORIDE 30 MG/1
60 CAPSULE, DELAYED RELEASE ORAL DAILY
Qty: 60 CAPSULE | Refills: 3 | Status: SHIPPED | OUTPATIENT
Start: 2023-11-06

## 2023-11-06 RX ORDER — PHENTERMINE HYDROCHLORIDE 37.5 MG/1
37.5 TABLET ORAL
Qty: 30 TABLET | Refills: 2 | Status: SHIPPED | OUTPATIENT
Start: 2023-11-06 | End: 2024-02-04

## 2023-11-06 RX ORDER — CIPROFLOXACIN 500 MG/1
500 TABLET, FILM COATED ORAL 2 TIMES DAILY
Qty: 14 TABLET | Refills: 0 | Status: SHIPPED | OUTPATIENT
Start: 2023-11-06 | End: 2023-11-13

## 2023-11-06 RX ORDER — EPINEPHRINE 0.3 MG/.3ML
0.3 INJECTION SUBCUTANEOUS
Qty: 1 EACH | Refills: 0 | Status: SHIPPED | OUTPATIENT
Start: 2023-11-06 | End: 2023-11-06

## 2023-11-06 RX ORDER — ALPRAZOLAM 0.5 MG/1
0.5 TABLET ORAL DAILY PRN
Qty: 30 TABLET | Refills: 1 | Status: SHIPPED | OUTPATIENT
Start: 2023-11-06 | End: 2024-01-05

## 2023-11-06 RX ORDER — AMOXICILLIN AND CLAVULANATE POTASSIUM 875; 125 MG/1; MG/1
1 TABLET, FILM COATED ORAL EVERY 12 HOURS
COMMUNITY
Start: 2023-09-14

## 2023-11-06 NOTE — PROGRESS NOTES
HPI:  Cindy Gold is a 47 y.o. female who presents today with   Chief Complaint   Patient presents with    Menopause    Other     Requesting a referral to Pain Management    Discuss Medications     Epipen     Weight Management      Pt has been on cymbalta;  she has a h/o fibromyalgia and anxiety and depression. Has anxiety and insomnia; has been on xanax and trazodone for anxiety and depression and seen Ita Treviño NP  and Lexus Baeza     Attemptted to manage anxiety without medication but has been unable to. Would like a med to help with anxiety    Menopause - she recently had a period for the 1st time in 3 months. Discussed the definition of menopause and perimenopause    Has been on phentermine before for weight loss. this helped; her energy was better ; she was exercising    Has been to [facility] recently treated for an infection    Wt Readings from Last 3 Encounters:   11/06/23 79.5 kg (175 lb 3.2 oz)   03/10/23 79.8 kg (176 lb)   03/09/23 81.1 kg (178 lb 12.8 oz)                No data to display                        PMH,  Meds, Allergies, Family History, Social history reviewed      Current Outpatient Medications   Medication Sig Dispense Refill    amoxicillin-clavulanate (AUGMENTIN) 875-125 MG per tablet Take 1 tablet by mouth in the morning and 1 tablet in the evening. diphenhydrAMINE (BENADRYL) 25 MG capsule Take 1 capsule by mouth every 6 hours as needed for Itching 1/2 tablet as needed      ciprofloxacin (CIPRO) 500 MG tablet Take 1 tablet by mouth 2 times daily for 7 days 14 tablet 0    EPINEPHrine (EPIPEN 2-ALIS) 0.3 MG/0.3ML SOAJ injection Inject 0.3 mLs into the muscle once as needed (anaphylaxis) Use as directed for allergic reaction 1 each 0    ALPRAZolam (XANAX) 0.5 MG tablet Take 1 tablet by mouth daily as needed for Sleep or Anxiety for up to 60 days.  Max Daily Amount: 0.5 mg 30 tablet 1    DULoxetine (CYMBALTA) 30 MG extended release capsule Take 2 capsules by mouth daily 60

## 2023-11-06 NOTE — PROGRESS NOTES
1. \"Have you been to the ER, urgent care clinic since your last visit? Hospitalized since your last visit? \" No    2. \"Have you seen or consulted any other health care providers outside of the [address] [place] [address] since your last visit? \"  Yes. Oscar Jewell       3. For patients aged 43-73: Has the patient had a colonoscopy / FIT/ Cologuard? No      If the patient is female:    4. For patients aged 43-66: Has the patient had a mammogram within the past 2 years? Yes - Care Gap present. Rooming MA/LPN to request most recent results. Patient to send by [employer]       5. For patients aged 21-65: Has the patient had a pap smear? Yes - Care Gap present.  Most recent result on file

## 2024-05-08 NOTE — TELEPHONE ENCOUNTER
Last Visit: 11/06/2023   Next Appointment: 06/10/2024    Requested Prescriptions     Pending Prescriptions Disp Refills    EPINEPHrine (EPIPEN 2-ALIS) 0.3 MG/0.3ML SOAJ injection 1 each 0     Sig: Inject 0.3 mLs into the muscle once as needed (anaphylaxis) Use as directed for allergic reaction

## 2024-05-16 RX ORDER — EPINEPHRINE 0.3 MG/.3ML
0.3 INJECTION SUBCUTANEOUS
Qty: 1 EACH | Refills: 0 | Status: SHIPPED | OUTPATIENT
Start: 2024-05-16 | End: 2024-05-16

## 2024-06-12 DIAGNOSIS — F32.A ANXIETY AND DEPRESSION: ICD-10-CM

## 2024-06-12 DIAGNOSIS — F41.9 ANXIETY AND DEPRESSION: ICD-10-CM

## 2024-06-18 RX ORDER — ALPRAZOLAM 0.5 MG/1
0.5 TABLET ORAL DAILY PRN
Qty: 30 TABLET | Refills: 0 | Status: SHIPPED | OUTPATIENT
Start: 2024-06-18 | End: 2024-08-17

## 2024-07-10 ENCOUNTER — OFFICE VISIT (OUTPATIENT)
Facility: CLINIC | Age: 54
End: 2024-07-10

## 2024-07-10 VITALS
SYSTOLIC BLOOD PRESSURE: 122 MMHG | HEART RATE: 71 BPM | RESPIRATION RATE: 16 BRPM | DIASTOLIC BLOOD PRESSURE: 66 MMHG | TEMPERATURE: 96.9 F | HEIGHT: 59 IN | BODY MASS INDEX: 33.87 KG/M2 | WEIGHT: 168 LBS | OXYGEN SATURATION: 96 %

## 2024-07-10 DIAGNOSIS — F32.A ANXIETY AND DEPRESSION: Primary | ICD-10-CM

## 2024-07-10 DIAGNOSIS — F41.9 ANXIETY AND DEPRESSION: Primary | ICD-10-CM

## 2024-07-10 DIAGNOSIS — Z76.89 ENCOUNTER FOR WEIGHT MANAGEMENT: ICD-10-CM

## 2024-07-10 DIAGNOSIS — F51.01 PRIMARY INSOMNIA: ICD-10-CM

## 2024-07-10 RX ORDER — TRAZODONE HYDROCHLORIDE 100 MG/1
TABLET ORAL
Status: CANCELLED | OUTPATIENT
Start: 2024-07-10

## 2024-07-10 RX ORDER — TRAZODONE HYDROCHLORIDE 100 MG/1
TABLET ORAL
COMMUNITY
End: 2024-07-10 | Stop reason: SDUPTHER

## 2024-07-10 RX ORDER — PHENTERMINE HYDROCHLORIDE 37.5 MG/1
37.5 TABLET ORAL
COMMUNITY
End: 2024-07-10 | Stop reason: SDUPTHER

## 2024-07-10 RX ORDER — ALPRAZOLAM 0.5 MG/1
0.5 TABLET ORAL DAILY PRN
Qty: 30 TABLET | Refills: 1 | Status: SHIPPED | OUTPATIENT
Start: 2024-07-10 | End: 2024-09-08

## 2024-07-10 RX ORDER — TRAZODONE HYDROCHLORIDE 100 MG/1
100 TABLET ORAL NIGHTLY PRN
Qty: 30 TABLET | Refills: 3 | Status: SHIPPED | OUTPATIENT
Start: 2024-07-10

## 2024-07-10 RX ORDER — PHENTERMINE HYDROCHLORIDE 37.5 MG/1
37.5 TABLET ORAL
Qty: 30 TABLET | Refills: 2 | Status: SHIPPED | OUTPATIENT
Start: 2024-07-10 | End: 2024-10-10

## 2024-07-10 NOTE — PROGRESS NOTES
1. \"Have you been to the ER, urgent care clinic since your last visit?  Hospitalized since your last visit?\" No    2. \"Have you seen or consulted any other health care providers outside of the [facility] since your last visit?\" Yes. PT [facility] Chiropractor- Dr. Viviana Rao & [facility] [place]      3. For patients aged 45-75: Has the patient had a colonoscopy / FIT/ Cologuard? No      If the patient is female:    4. For patients aged 40-74: Has the patient had a mammogram within the past 2 years? Yes - Care Gap present. Rooming MA/LPN to request most recent results      5. For patients aged 21-65: Has the patient had a pap smear? Yes - Care Gap present. Most recent result on file

## 2024-07-10 NOTE — PATIENT INSTRUCTIONS
Patient Education        Learning About the Mediterranean Diet  What is the Mediterranean diet?     The Mediterranean diet is a style of eating rather than a diet plan. It features foods eaten in [place], [place], [place], and other countries along the [place] Sea. It emphasizes eating foods like fish, fruits, vegetables, beans, high-fiber breads and whole grains, nuts, and olive oil. This style of eating includes limited red meat, cheese, and sweets.  Why choose the Mediterranean diet?  A Mediterranean-style diet may improve heart health. It contains more fat than other heart-healthy diets. But the fats are mainly from nuts, unsaturated oils (such as fish oils and olive oil), and certain nut or seed oils (such as canola, soybean, or flaxseed oil). These fats may help protect the heart and blood vessels.  How can you get started on the Mediterranean diet?  Here are some things you can do to switch to a more Mediterranean way of eating.  What to eat  Eat a variety of fruits and vegetables each day, such as grapes, blueberries, tomatoes, broccoli, peppers, figs, olives, spinach, eggplant, beans, lentils, and chickpeas.  Eat a variety of whole-grain foods each day, such as oats, brown rice, and whole wheat bread, pasta, and couscous.  Eat fish at least 2 times a week. Try tuna, salmon, mackerel, lake trout, herring, or sardines.  Eat moderate amounts of low-fat dairy products, such as milk, cheese, or yogurt.  Eat moderate amounts of poultry and eggs.  Choose healthy (unsaturated) fats, such as nuts, olive oil, and certain nut or seed oils like canola, soybean, and flaxseed.  Limit unhealthy (saturated) fats, such as butter, palm oil, and coconut oil. And limit fats found in animal products, such as meat and dairy products made with whole milk. Try to eat red meat only a few times a month in very small amounts.  Limit sweets and desserts to only a few times a week. This includes sugar-sweetened

## 2024-07-10 NOTE — PROGRESS NOTES
HPI:  Lydia Phelps is a 53 y.o. female who presents today with   Chief Complaint   Patient presents with    Anxiety    Medication Refill    health maintenance        She has situational stressors  Has anxiety and depression  She is prescribed xanax and trazodone.  She requests refills.  She has insomnia, trazodone helps insomnia.      Patient also is interested and assisted in weight loss.  Current BMI is 33.93.  She has been prescribed phentermine.      Wt Readings from Last 3 Encounters:   07/10/24 76.2 kg (168 lb)   11/06/23 79.5 kg (175 lb 3.2 oz)   03/10/23 79.8 kg (176 lb)     Pt has lost weight. She had been doing intermittent fasting. She has tolerated the phentermine. She is exercising. Uses an exercise program at her job at [employer].    States her left ear itches and she \" dug\" in it and requests an ear exam;          No data to display                        PMH,  Meds, Allergies, Family History, Social history reviewed      Current Outpatient Medications   Medication Sig Dispense Refill    ALPRAZolam (XANAX) 0.5 MG tablet Take 1 tablet by mouth daily as needed for Sleep or Anxiety for up to 60 days. Max Daily Amount: 0.5 mg 30 tablet 1    phentermine (ADIPEX-P) 37.5 MG tablet Take 1 tablet by mouth every morning (before breakfast) for 92 days. Max Daily Amount: 37.5 mg 30 tablet 2    traZODone (DESYREL) 100 MG tablet Take 1 tablet by mouth nightly as needed for Sleep 30 tablet 3    diphenhydrAMINE (BENADRYL) 25 MG capsule Take 1 capsule by mouth every 6 hours as needed for Itching 1/2 tablet as needed      EQ PAIN RELIEVER 500 MG tablet TAKE 2 TABLETS BY MOUTH EVERY 8 HOURS AS NEEDED      EPINEPHrine (EPIPEN 2-ALIS) 0.3 MG/0.3ML SOAJ injection Inject 0.3 mLs into the muscle once as needed (anaphylaxis) Use as directed for allergic reaction 1 each 0     No current facility-administered medications for this visit.        Allergies   Allergen Reactions    Hydrocodone Dizziness or Vertigo

## 2024-11-21 ENCOUNTER — OFFICE VISIT (OUTPATIENT)
Facility: CLINIC | Age: 54
End: 2024-11-21
Payer: MEDICAID

## 2024-11-21 VITALS
HEART RATE: 80 BPM | SYSTOLIC BLOOD PRESSURE: 124 MMHG | RESPIRATION RATE: 16 BRPM | TEMPERATURE: 97.3 F | HEIGHT: 59 IN | OXYGEN SATURATION: 98 % | DIASTOLIC BLOOD PRESSURE: 69 MMHG | WEIGHT: 167.4 LBS | BODY MASS INDEX: 33.75 KG/M2

## 2024-11-21 DIAGNOSIS — F32.A ANXIETY AND DEPRESSION: Primary | ICD-10-CM

## 2024-11-21 DIAGNOSIS — F51.01 PRIMARY INSOMNIA: ICD-10-CM

## 2024-11-21 DIAGNOSIS — E55.9 VITAMIN D DEFICIENCY: ICD-10-CM

## 2024-11-21 DIAGNOSIS — F41.9 ANXIETY AND DEPRESSION: Primary | ICD-10-CM

## 2024-11-21 DIAGNOSIS — E78.00 HYPERCHOLESTEROLEMIA: ICD-10-CM

## 2024-11-21 PROCEDURE — 99214 OFFICE O/P EST MOD 30 MIN: CPT | Performed by: FAMILY MEDICINE

## 2024-11-21 RX ORDER — ALPRAZOLAM 0.5 MG
0.5 TABLET ORAL NIGHTLY PRN
COMMUNITY
End: 2024-11-21 | Stop reason: SDUPTHER

## 2024-11-21 RX ORDER — TRAZODONE HYDROCHLORIDE 100 MG/1
100 TABLET ORAL NIGHTLY PRN
Qty: 30 TABLET | Refills: 3 | Status: SHIPPED | OUTPATIENT
Start: 2024-11-21

## 2024-11-21 RX ORDER — ALPRAZOLAM 0.5 MG
0.5 TABLET ORAL NIGHTLY PRN
Qty: 30 TABLET | Refills: 3 | Status: SHIPPED | OUTPATIENT
Start: 2024-11-21 | End: 2025-05-21

## 2024-11-21 RX ORDER — PHENTERMINE HYDROCHLORIDE 37.5 MG/1
TABLET ORAL
COMMUNITY
Start: 2024-11-13

## 2024-11-21 ASSESSMENT — PATIENT HEALTH QUESTIONNAIRE - PHQ9
2. FEELING DOWN, DEPRESSED OR HOPELESS: SEVERAL DAYS
SUM OF ALL RESPONSES TO PHQ QUESTIONS 1-9: 10
9. THOUGHTS THAT YOU WOULD BE BETTER OFF DEAD, OR OF HURTING YOURSELF: NOT AT ALL
SUM OF ALL RESPONSES TO PHQ QUESTIONS 1-9: 10
SUM OF ALL RESPONSES TO PHQ9 QUESTIONS 1 & 2: 2
SUM OF ALL RESPONSES TO PHQ QUESTIONS 1-9: 10
10. IF YOU CHECKED OFF ANY PROBLEMS, HOW DIFFICULT HAVE THESE PROBLEMS MADE IT FOR YOU TO DO YOUR WORK, TAKE CARE OF THINGS AT HOME, OR GET ALONG WITH OTHER PEOPLE: SOMEWHAT DIFFICULT
3. TROUBLE FALLING OR STAYING ASLEEP: MORE THAN HALF THE DAYS
7. TROUBLE CONCENTRATING ON THINGS, SUCH AS READING THE NEWSPAPER OR WATCHING TELEVISION: MORE THAN HALF THE DAYS
5. POOR APPETITE OR OVEREATING: SEVERAL DAYS
1. LITTLE INTEREST OR PLEASURE IN DOING THINGS: SEVERAL DAYS
SUM OF ALL RESPONSES TO PHQ QUESTIONS 1-9: 10
8. MOVING OR SPEAKING SO SLOWLY THAT OTHER PEOPLE COULD HAVE NOTICED. OR THE OPPOSITE, BEING SO FIGETY OR RESTLESS THAT YOU HAVE BEEN MOVING AROUND A LOT MORE THAN USUAL: NOT AT ALL
6. FEELING BAD ABOUT YOURSELF - OR THAT YOU ARE A FAILURE OR HAVE LET YOURSELF OR YOUR FAMILY DOWN: MORE THAN HALF THE DAYS
4. FEELING TIRED OR HAVING LITTLE ENERGY: SEVERAL DAYS

## 2024-11-21 ASSESSMENT — ANXIETY QUESTIONNAIRES
IF YOU CHECKED OFF ANY PROBLEMS ON THIS QUESTIONNAIRE, HOW DIFFICULT HAVE THESE PROBLEMS MADE IT FOR YOU TO DO YOUR WORK, TAKE CARE OF THINGS AT HOME, OR GET ALONG WITH OTHER PEOPLE: SOMEWHAT DIFFICULT
4. TROUBLE RELAXING: MORE THAN HALF THE DAYS
6. BECOMING EASILY ANNOYED OR IRRITABLE: SEVERAL DAYS
7. FEELING AFRAID AS IF SOMETHING AWFUL MIGHT HAPPEN: MORE THAN HALF THE DAYS
1. FEELING NERVOUS, ANXIOUS, OR ON EDGE: SEVERAL DAYS
3. WORRYING TOO MUCH ABOUT DIFFERENT THINGS: MORE THAN HALF THE DAYS
2. NOT BEING ABLE TO STOP OR CONTROL WORRYING: MORE THAN HALF THE DAYS
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
## 2024-11-21 NOTE — PROGRESS NOTES
\"Have you been to the ER, urgent care clinic since your last visit?  Hospitalized since your last visit?\"    NO    “Have you seen or consulted any other health care providers outside of [facility]?”    This include any pap smears or colon screening.  Yes. PT [facility] Chiropractor- Dr. Viviana Rao & Therapy- [facility] [place]- [facility], Mammogram [facility]- ([place])    Have you had a mammogram?”   NO, last done 06/2023 Mammogram [facility] ([place])    No breast cancer screening on file     “Have you had a colorectal cancer screening such as a colonoscopy/FIT/Cologuard?    NO    No colonoscopy on file  No cologuard on file  No FIT/FOBT on file   No flexible sigmoidoscopy on file     Click Here for Release of Records Request

## 2024-11-22 LAB
25(OH)D3+25(OH)D2 SERPL-MCNC: 52.8 NG/ML (ref 30–100)
ALBUMIN SERPL-MCNC: 4.4 G/DL (ref 3.8–4.9)
ALP SERPL-CCNC: 80 IU/L (ref 44–121)
ALT SERPL-CCNC: 9 IU/L (ref 0–32)
AST SERPL-CCNC: 11 IU/L (ref 0–40)
BILIRUB SERPL-MCNC: 0.7 MG/DL (ref 0–1.2)
BUN SERPL-MCNC: 11 MG/DL (ref 6–24)
BUN/CREAT SERPL: 13 (ref 9–23)
CALCIUM SERPL-MCNC: 9.8 MG/DL (ref 8.7–10.2)
CHLORIDE SERPL-SCNC: 99 MMOL/L (ref 96–106)
CHOLEST SERPL-MCNC: 268 MG/DL (ref 100–199)
CO2 SERPL-SCNC: 26 MMOL/L (ref 20–29)
CREAT SERPL-MCNC: 0.85 MG/DL (ref 0.57–1)
EGFRCR SERPLBLD CKD-EPI 2021: 82 ML/MIN/1.73
GLOBULIN SER CALC-MCNC: 3.2 G/DL (ref 1.5–4.5)
GLUCOSE SERPL-MCNC: 87 MG/DL (ref 70–99)
HDLC SERPL-MCNC: 63 MG/DL
LDLC SERPL CALC-MCNC: 191 MG/DL (ref 0–99)
Lab: ABNORMAL
POTASSIUM SERPL-SCNC: 4.7 MMOL/L (ref 3.5–5.2)
PROT SERPL-MCNC: 7.6 G/DL (ref 6–8.5)
SODIUM SERPL-SCNC: 137 MMOL/L (ref 134–144)
TRIGL SERPL-MCNC: 82 MG/DL (ref 0–149)
TSH SERPL DL<=0.005 MIU/L-ACNC: 1.41 UIU/ML (ref 0.45–4.5)
VLDLC SERPL CALC-MCNC: 14 MG/DL (ref 5–40)

## 2025-01-06 ENCOUNTER — COMMUNITY OUTREACH (OUTPATIENT)
Facility: CLINIC | Age: 55
End: 2025-01-06

## 2025-04-28 DIAGNOSIS — F32.A ANXIETY AND DEPRESSION: ICD-10-CM

## 2025-04-28 DIAGNOSIS — F41.9 ANXIETY AND DEPRESSION: ICD-10-CM

## 2025-04-29 NOTE — TELEPHONE ENCOUNTER
[facility]  reports [facility] last [facility] date : No Access     Last Visit: 11/21/2024  Next Appointment: N/A  Controlled Substance Agreement: N/A  Urine Drug Screen: N/A  Previous Refill Encounter(s): Date:    Disp Refills Start End    ALPRAZolam (XANAX) 0.5 MG tablet 30 tablet 3 11/21/2024 5/21/2025    Sig - Route: Take 1 tablet by mouth nightly as needed for Sleep for up to 120 doses. Max Daily Amount: 0.5 mg - Oral    Sent to pharmacy as: ALPRAZolam 0.5 MG Oral Tablet (XANAX)    E-Prescribing Status: Receipt confirmed by pharmacy (11/21/2024  9:27 AM EST)  Pharmacy    [facility] [address] [place] [address] - [address] [place] [address] -  [phone] - F [phone]  [address] [place] [id number]  Phone: [phone]  Fax: [phone]         Requested Prescriptions     Pending Prescriptions Disp Refills    ALPRAZolam (XANAX) 0.5 MG tablet 30 tablet 3     Sig: Take 1 tablet by mouth nightly as needed for Sleep for up to 120 doses. Max Daily Amount: 0.5 mg

## 2025-05-02 RX ORDER — ALPRAZOLAM 0.5 MG
0.5 TABLET ORAL NIGHTLY PRN
Qty: 30 TABLET | Refills: 0 | Status: SHIPPED | OUTPATIENT
Start: 2025-05-02 | End: 2025-10-30